# Patient Record
Sex: MALE | Race: WHITE | NOT HISPANIC OR LATINO | URBAN - METROPOLITAN AREA
[De-identification: names, ages, dates, MRNs, and addresses within clinical notes are randomized per-mention and may not be internally consistent; named-entity substitution may affect disease eponyms.]

---

## 2023-12-28 ENCOUNTER — APPOINTMENT (OUTPATIENT)
Dept: URGENT CARE | Facility: CLINIC | Age: 28
End: 2023-12-28

## 2024-01-29 ENCOUNTER — HOSPITAL ENCOUNTER (INPATIENT)
Facility: HOSPITAL | Age: 29
LOS: 4 days | Discharge: HOME | DRG: 420 | End: 2024-02-02
Attending: EMERGENCY MEDICINE | Admitting: INTERNAL MEDICINE
Payer: MEDICARE

## 2024-01-29 DIAGNOSIS — E10.10 DKA, TYPE 1 (HCC): Primary | ICD-10-CM

## 2024-01-29 PROBLEM — R11.2 N&V (NAUSEA AND VOMITING): Status: ACTIVE | Noted: 2024-01-29

## 2024-01-29 PROBLEM — N17.9 AKI (ACUTE KIDNEY INJURY) (HCC): Status: ACTIVE | Noted: 2024-01-29

## 2024-01-29 PROBLEM — E87.20 LACTIC ACIDOSIS: Status: ACTIVE | Noted: 2024-01-29

## 2024-01-29 PROBLEM — I51.9 CARDIAC COMPLICATION: Status: ACTIVE | Noted: 2023-03-31

## 2024-01-29 PROBLEM — E83.39 HYPERPHOSPHATEMIA: Status: ACTIVE | Noted: 2024-01-29

## 2024-01-29 PROBLEM — E87.1 HYPONATREMIA: Status: ACTIVE | Noted: 2024-01-29

## 2024-01-29 PROBLEM — R65.10 SIRS (SYSTEMIC INFLAMMATORY RESPONSE SYNDROME) (HCC): Status: ACTIVE | Noted: 2024-01-29

## 2024-01-29 PROBLEM — G43.009 MIGRAINE WITHOUT AURA: Status: ACTIVE | Noted: 2023-03-31

## 2024-01-29 PROBLEM — Z86.69 HX OF SEIZURE DISORDER: Status: ACTIVE | Noted: 2024-01-29

## 2024-01-29 PROBLEM — F32.A DEPRESSION: Status: ACTIVE | Noted: 2022-02-03

## 2024-01-29 LAB
ALBUMIN SERPL BCP-MCNC: 4.8 G/DL (ref 3.5–5)
ALP SERPL-CCNC: 90 U/L (ref 34–104)
ALT SERPL W P-5'-P-CCNC: 19 U/L (ref 7–52)
ANION GAP SERPL CALCULATED.3IONS-SCNC: 28 MMOL/L
ANION GAP SERPL CALCULATED.3IONS-SCNC: 38 MMOL/L
APAP SERPL-MCNC: <2 UG/ML (ref 10–20)
AST SERPL W P-5'-P-CCNC: 19 U/L (ref 13–39)
ATRIAL RATE: 121 BPM
ATRIAL RATE: 122 BPM
ATRIAL RATE: 123 BPM
BACTERIA UR QL AUTO: ABNORMAL /HPF
BASE EX.OXY STD BLDV CALC-SCNC: 74.8 % (ref 60–80)
BASE EXCESS BLDA CALC-SCNC: -22 MMOL/L (ref -2–3)
BASE EXCESS BLDV CALC-SCNC: -23.8 MMOL/L
BETA-HYDROXYBUTYRATE: 5.2 MMOL/L
BILIRUB SERPL-MCNC: 1.47 MG/DL (ref 0.2–1)
BILIRUB UR QL STRIP: NEGATIVE
BUN SERPL-MCNC: 33 MG/DL (ref 5–25)
BUN SERPL-MCNC: 35 MG/DL (ref 5–25)
BUN SERPL-MCNC: 36 MG/DL (ref 5–25)
BUN SERPL-MCNC: 36 MG/DL (ref 5–25)
CA-I BLD-SCNC: 1.12 MMOL/L (ref 1.12–1.32)
CALCIUM SERPL-MCNC: 10.1 MG/DL (ref 8.4–10.2)
CALCIUM SERPL-MCNC: 9.4 MG/DL (ref 8.4–10.2)
CHLORIDE SERPL-SCNC: 100 MMOL/L (ref 96–108)
CHLORIDE SERPL-SCNC: 88 MMOL/L (ref 96–108)
CLARITY UR: CLEAR
CO2 SERPL-SCNC: 10 MMOL/L (ref 21–32)
CO2 SERPL-SCNC: 7 MMOL/L (ref 21–32)
CO2 SERPL-SCNC: 7 MMOL/L (ref 21–32)
CO2 SERPL-SCNC: 8 MMOL/L (ref 21–32)
COLOR UR: ABNORMAL
CREAT SERPL-MCNC: 1.53 MG/DL (ref 0.6–1.3)
CREAT SERPL-MCNC: 1.94 MG/DL (ref 0.6–1.3)
ERYTHROCYTE [DISTWIDTH] IN BLOOD BY AUTOMATED COUNT: 12.2 % (ref 11.6–15.1)
EST. AVERAGE GLUCOSE BLD GHB EST-MCNC: 212 MG/DL
ETHANOL SERPL-MCNC: <10 MG/DL
GFR SERPL CREATININE-BSD FRML MDRD: 45 ML/MIN/1.73SQ M
GFR SERPL CREATININE-BSD FRML MDRD: 60 ML/MIN/1.73SQ M
GLUCOSE SERPL-MCNC: 368 MG/DL (ref 65–140)
GLUCOSE SERPL-MCNC: 436 MG/DL (ref 65–140)
GLUCOSE SERPL-MCNC: 525 MG/DL (ref 65–140)
GLUCOSE SERPL-MCNC: 541 MG/DL (ref 65–140)
GLUCOSE SERPL-MCNC: 541 MG/DL (ref 65–140)
GLUCOSE SERPL-MCNC: 656 MG/DL (ref 65–140)
GLUCOSE SERPL-MCNC: 802 MG/DL (ref 65–140)
GLUCOSE SERPL-MCNC: 803 MG/DL (ref 65–140)
GLUCOSE SERPL-MCNC: 803 MG/DL (ref 65–140)
GLUCOSE SERPL-MCNC: >500 MG/DL (ref 65–140)
GLUCOSE SERPL-MCNC: >600 MG/DL (ref 65–140)
GLUCOSE UR STRIP-MCNC: ABNORMAL MG/DL
HBA1C MFR BLD: 9 %
HCO3 BLDA-SCNC: 7.2 MMOL/L (ref 24–30)
HCO3 BLDV-SCNC: 7 MMOL/L (ref 24–30)
HCT VFR BLD AUTO: 50.5 % (ref 36.5–49.3)
HCT VFR BLD CALC: 52 % (ref 36.5–49.3)
HGB BLD-MCNC: 16 G/DL (ref 12–17)
HGB BLDA-MCNC: 17.7 G/DL (ref 12–17)
HGB UR QL STRIP.AUTO: ABNORMAL
HYALINE CASTS #/AREA URNS LPF: ABNORMAL /LPF
KETONES UR STRIP-MCNC: ABNORMAL MG/DL
LACTATE SERPL-SCNC: 3.5 MMOL/L (ref 0.5–2)
LACTATE SERPL-SCNC: 7.8 MMOL/L (ref 0.5–2)
LEUKOCYTE ESTERASE UR QL STRIP: NEGATIVE
MAGNESIUM SERPL-MCNC: 2.1 MG/DL (ref 1.9–2.7)
MAGNESIUM SERPL-MCNC: 2.3 MG/DL (ref 1.9–2.7)
MAGNESIUM SERPL-MCNC: 2.4 MG/DL (ref 1.9–2.7)
MCH RBC QN AUTO: 27.9 PG (ref 26.8–34.3)
MCHC RBC AUTO-ENTMCNC: 31.7 G/DL (ref 31.4–37.4)
MCV RBC AUTO: 88 FL (ref 82–98)
NITRITE UR QL STRIP: NEGATIVE
NON-SQ EPI CELLS URNS QL MICRO: ABNORMAL /HPF
O2 CT BLDV-SCNC: 17.8 ML/DL
P AXIS: 73 DEGREES
P AXIS: 76 DEGREES
P AXIS: 77 DEGREES
PCO2 BLD: 24.9 MM HG (ref 42–50)
PCO2 BLD: 8 MMOL/L (ref 21–32)
PCO2 BLDV: 30.1 MM HG (ref 42–50)
PH BLD: 7.07 [PH] (ref 7.3–7.4)
PH BLDV: 6.98 [PH] (ref 7.3–7.4)
PH UR STRIP.AUTO: 5.5 [PH]
PHOSPHATE SERPL-MCNC: 10.4 MG/DL (ref 2.7–4.5)
PHOSPHATE SERPL-MCNC: 10.5 MG/DL (ref 2.7–4.5)
PHOSPHATE SERPL-MCNC: 4.7 MG/DL (ref 2.7–4.5)
PLATELET # BLD AUTO: 324 THOUSANDS/UL (ref 149–390)
PMV BLD AUTO: 12.6 FL (ref 8.9–12.7)
PO2 BLD: 29 MM HG (ref 35–45)
PO2 BLDV: 49 MM HG (ref 35–45)
POTASSIUM BLD-SCNC: 5.1 MMOL/L (ref 3.5–5.3)
POTASSIUM SERPL-SCNC: 4.8 MMOL/L (ref 3.5–5.3)
POTASSIUM SERPL-SCNC: 5 MMOL/L (ref 3.5–5.3)
PR INTERVAL: 132 MS
PR INTERVAL: 138 MS
PR INTERVAL: 142 MS
PROT SERPL-MCNC: 8.9 G/DL (ref 6.4–8.4)
PROT UR STRIP-MCNC: ABNORMAL MG/DL
QRS AXIS: 135 DEGREES
QRS AXIS: 156 DEGREES
QRS AXIS: 175 DEGREES
QRSD INTERVAL: 90 MS
QRSD INTERVAL: 92 MS
QRSD INTERVAL: 94 MS
QT INTERVAL: 328 MS
QT INTERVAL: 330 MS
QT INTERVAL: 340 MS
QTC INTERVAL: 467 MS
QTC INTERVAL: 472 MS
QTC INTERVAL: 482 MS
RBC # BLD AUTO: 5.73 MILLION/UL (ref 3.88–5.62)
RBC #/AREA URNS AUTO: ABNORMAL /HPF
SALICYLATES SERPL-MCNC: <5 MG/DL (ref 3–20)
SAO2 % BLD FROM PO2: 35 % (ref 60–85)
SODIUM BLD-SCNC: 132 MMOL/L (ref 136–145)
SODIUM SERPL-SCNC: 133 MMOL/L (ref 135–147)
SODIUM SERPL-SCNC: 133 MMOL/L (ref 135–147)
SODIUM SERPL-SCNC: 134 MMOL/L (ref 135–147)
SODIUM SERPL-SCNC: 138 MMOL/L (ref 135–147)
SP GR UR STRIP.AUTO: 1.02 (ref 1–1.03)
SPECIMEN SOURCE: ABNORMAL
T WAVE AXIS: 48 DEGREES
T WAVE AXIS: 54 DEGREES
T WAVE AXIS: 58 DEGREES
UROBILINOGEN UR STRIP-ACNC: <2 MG/DL
VENTRICULAR RATE: 121 BPM
VENTRICULAR RATE: 122 BPM
VENTRICULAR RATE: 123 BPM
WBC # BLD AUTO: 36.38 THOUSAND/UL (ref 4.31–10.16)
WBC #/AREA URNS AUTO: ABNORMAL /HPF

## 2024-01-29 PROCEDURE — 80048 BASIC METABOLIC PNL TOTAL CA: CPT | Performed by: NURSE PRACTITIONER

## 2024-01-29 PROCEDURE — 96374 THER/PROPH/DIAG INJ IV PUSH: CPT

## 2024-01-29 PROCEDURE — 83735 ASSAY OF MAGNESIUM: CPT | Performed by: EMERGENCY MEDICINE

## 2024-01-29 PROCEDURE — 84132 ASSAY OF SERUM POTASSIUM: CPT

## 2024-01-29 PROCEDURE — 80048 BASIC METABOLIC PNL TOTAL CA: CPT | Performed by: EMERGENCY MEDICINE

## 2024-01-29 PROCEDURE — 80053 COMPREHEN METABOLIC PANEL: CPT | Performed by: EMERGENCY MEDICINE

## 2024-01-29 PROCEDURE — 99284 EMERGENCY DEPT VISIT MOD MDM: CPT

## 2024-01-29 PROCEDURE — 82805 BLOOD GASES W/O2 SATURATION: CPT | Performed by: EMERGENCY MEDICINE

## 2024-01-29 PROCEDURE — 83036 HEMOGLOBIN GLYCOSYLATED A1C: CPT | Performed by: EMERGENCY MEDICINE

## 2024-01-29 PROCEDURE — 82010 KETONE BODYS QUAN: CPT | Performed by: EMERGENCY MEDICINE

## 2024-01-29 PROCEDURE — 36415 COLL VENOUS BLD VENIPUNCTURE: CPT | Performed by: EMERGENCY MEDICINE

## 2024-01-29 PROCEDURE — 99223 1ST HOSP IP/OBS HIGH 75: CPT | Performed by: PHYSICIAN ASSISTANT

## 2024-01-29 PROCEDURE — 82947 ASSAY GLUCOSE BLOOD QUANT: CPT

## 2024-01-29 PROCEDURE — 85014 HEMATOCRIT: CPT

## 2024-01-29 PROCEDURE — 93005 ELECTROCARDIOGRAM TRACING: CPT

## 2024-01-29 PROCEDURE — 99291 CRITICAL CARE FIRST HOUR: CPT | Performed by: EMERGENCY MEDICINE

## 2024-01-29 PROCEDURE — 84295 ASSAY OF SERUM SODIUM: CPT

## 2024-01-29 PROCEDURE — 82077 ASSAY SPEC XCP UR&BREATH IA: CPT | Performed by: PHYSICIAN ASSISTANT

## 2024-01-29 PROCEDURE — 85027 COMPLETE CBC AUTOMATED: CPT | Performed by: EMERGENCY MEDICINE

## 2024-01-29 PROCEDURE — 82948 REAGENT STRIP/BLOOD GLUCOSE: CPT

## 2024-01-29 PROCEDURE — 81001 URINALYSIS AUTO W/SCOPE: CPT | Performed by: PHYSICIAN ASSISTANT

## 2024-01-29 PROCEDURE — 82803 BLOOD GASES ANY COMBINATION: CPT

## 2024-01-29 PROCEDURE — 82947 ASSAY GLUCOSE BLOOD QUANT: CPT | Performed by: NURSE PRACTITIONER

## 2024-01-29 PROCEDURE — 82330 ASSAY OF CALCIUM: CPT

## 2024-01-29 PROCEDURE — 83605 ASSAY OF LACTIC ACID: CPT | Performed by: EMERGENCY MEDICINE

## 2024-01-29 PROCEDURE — 84100 ASSAY OF PHOSPHORUS: CPT | Performed by: EMERGENCY MEDICINE

## 2024-01-29 PROCEDURE — 80179 DRUG ASSAY SALICYLATE: CPT | Performed by: PHYSICIAN ASSISTANT

## 2024-01-29 PROCEDURE — 84100 ASSAY OF PHOSPHORUS: CPT | Performed by: PHYSICIAN ASSISTANT

## 2024-01-29 PROCEDURE — 82947 ASSAY GLUCOSE BLOOD QUANT: CPT | Performed by: PHYSICIAN ASSISTANT

## 2024-01-29 PROCEDURE — 83735 ASSAY OF MAGNESIUM: CPT | Performed by: PHYSICIAN ASSISTANT

## 2024-01-29 PROCEDURE — 80143 DRUG ASSAY ACETAMINOPHEN: CPT | Performed by: PHYSICIAN ASSISTANT

## 2024-01-29 RX ORDER — SODIUM CHLORIDE 9 MG/ML
500 INJECTION, SOLUTION INTRAVENOUS CONTINUOUS
Status: DISCONTINUED | OUTPATIENT
Start: 2024-01-29 | End: 2024-01-29

## 2024-01-29 RX ORDER — SODIUM CHLORIDE, SODIUM LACTATE, POTASSIUM CHLORIDE, CALCIUM CHLORIDE 600; 310; 30; 20 MG/100ML; MG/100ML; MG/100ML; MG/100ML
250 INJECTION, SOLUTION INTRAVENOUS CONTINUOUS
Status: DISCONTINUED | OUTPATIENT
Start: 2024-01-29 | End: 2024-01-30

## 2024-01-29 RX ORDER — SODIUM CHLORIDE 9 MG/ML
3 INJECTION INTRAVENOUS
Status: DISCONTINUED | OUTPATIENT
Start: 2024-01-29 | End: 2024-02-02 | Stop reason: HOSPADM

## 2024-01-29 RX ORDER — SODIUM CHLORIDE 9 MG/ML
2000 INJECTION, SOLUTION INTRAVENOUS CONTINUOUS
Status: DISPENSED | OUTPATIENT
Start: 2024-01-29 | End: 2024-01-29

## 2024-01-29 RX ORDER — ONDANSETRON 2 MG/ML
4 INJECTION INTRAMUSCULAR; INTRAVENOUS ONCE
Status: DISCONTINUED | OUTPATIENT
Start: 2024-01-29 | End: 2024-01-30

## 2024-01-29 RX ORDER — DEXTROSE AND SODIUM CHLORIDE 5; .45 G/100ML; G/100ML
250 INJECTION, SOLUTION INTRAVENOUS CONTINUOUS
Status: DISCONTINUED | OUTPATIENT
Start: 2024-01-29 | End: 2024-01-30

## 2024-01-29 RX ORDER — ONDANSETRON 2 MG/ML
4 INJECTION INTRAMUSCULAR; INTRAVENOUS EVERY 4 HOURS PRN
Status: DISCONTINUED | OUTPATIENT
Start: 2024-01-29 | End: 2024-01-30

## 2024-01-29 RX ORDER — HEPARIN SODIUM 5000 [USP'U]/ML
5000 INJECTION, SOLUTION INTRAVENOUS; SUBCUTANEOUS EVERY 8 HOURS SCHEDULED
Status: DISCONTINUED | OUTPATIENT
Start: 2024-01-29 | End: 2024-02-02 | Stop reason: HOSPADM

## 2024-01-29 RX ORDER — SODIUM CHLORIDE 9 MG/ML
250 INJECTION, SOLUTION INTRAVENOUS CONTINUOUS
Status: DISCONTINUED | OUTPATIENT
Start: 2024-01-29 | End: 2024-01-29

## 2024-01-29 RX ORDER — SODIUM CHLORIDE, SODIUM LACTATE, POTASSIUM CHLORIDE, CALCIUM CHLORIDE 600; 310; 30; 20 MG/100ML; MG/100ML; MG/100ML; MG/100ML
500 INJECTION, SOLUTION INTRAVENOUS CONTINUOUS
Status: DISPENSED | OUTPATIENT
Start: 2024-01-29 | End: 2024-01-29

## 2024-01-29 RX ORDER — CHLORHEXIDINE GLUCONATE ORAL RINSE 1.2 MG/ML
15 SOLUTION DENTAL EVERY 12 HOURS SCHEDULED
Status: DISCONTINUED | OUTPATIENT
Start: 2024-01-29 | End: 2024-01-31

## 2024-01-29 RX ORDER — ONDANSETRON 2 MG/ML
4 INJECTION INTRAMUSCULAR; INTRAVENOUS ONCE
Status: COMPLETED | OUTPATIENT
Start: 2024-01-29 | End: 2024-01-29

## 2024-01-29 RX ADMIN — SODIUM CHLORIDE, SODIUM LACTATE, POTASSIUM CHLORIDE, AND CALCIUM CHLORIDE 500 ML/HR: .6; .31; .03; .02 INJECTION, SOLUTION INTRAVENOUS at 20:29

## 2024-01-29 RX ADMIN — SODIUM CHLORIDE, SODIUM LACTATE, POTASSIUM CHLORIDE, AND CALCIUM CHLORIDE 500 ML/HR: .6; .31; .03; .02 INJECTION, SOLUTION INTRAVENOUS at 18:18

## 2024-01-29 RX ADMIN — ONDANSETRON 4 MG: 2 INJECTION INTRAMUSCULAR; INTRAVENOUS at 18:27

## 2024-01-29 RX ADMIN — SODIUM CHLORIDE 5.9 UNITS/HR: 9 INJECTION, SOLUTION INTRAVENOUS at 17:16

## 2024-01-29 RX ADMIN — SODIUM CHLORIDE 2000 ML/HR: 0.9 INJECTION, SOLUTION INTRAVENOUS at 16:42

## 2024-01-29 RX ADMIN — HEPARIN SODIUM 5000 UNITS: 5000 INJECTION INTRAVENOUS; SUBCUTANEOUS at 21:46

## 2024-01-29 RX ADMIN — CHLORHEXIDINE GLUCONATE 15 ML: 1.2 SOLUTION ORAL at 21:46

## 2024-01-29 RX ADMIN — SODIUM CHLORIDE, SODIUM LACTATE, POTASSIUM CHLORIDE, AND CALCIUM CHLORIDE 250 ML/HR: .6; .31; .03; .02 INJECTION, SOLUTION INTRAVENOUS at 22:38

## 2024-01-29 RX ADMIN — ONDANSETRON 4 MG: 2 INJECTION INTRAMUSCULAR; INTRAVENOUS at 16:53

## 2024-01-29 NOTE — ASSESSMENT & PLAN NOTE
Baseline Cr 0.9 in care everywhere  Cr on admission 1.94  UA bland  Likely prerenal 2/2 severe DKA, intravascular dehydration   Aggressive IVF resuscitation   Trend renal indices  Strict I/O

## 2024-01-29 NOTE — ASSESSMENT & PLAN NOTE
Lab Results   Component Value Date    HGBA1C 10.9 (H) 05/20/2022       Recent Labs     01/29/24  1643   POCGLU >500*         Blood Sugar Average: Last 72 hrs:    Hx of T1DM on SQ insulin regimen  Pt reports running out of insulin needles 2 days ago. He has since had progressive N/V/D and polydipsia  DKA POA AEB: pH 6.98, CO2 7, AG 38, , BHB 5.2  Initiate DKA protocol  IVF per protocol; transition to dextrose-containing IVF when BG < 250  DKA insulin gtt; transition to non-DKA insulin gtt when AG closed x 2, CO2>20 x2  Q1h BG  Q4h bmp, mag, and phos  NPO for now   Check Hgb A1C  Replete electrolytes PRN with goal K>4 and Mag>2  Will need to ensure refill of insulin needles prior to d/c

## 2024-01-29 NOTE — ASSESSMENT & PLAN NOTE
Lactic 7.8 on admission -- trend   In the setting of severe DKA -- do not suspect infectious etiology at this time  Continue IVF resuscitation per DKA protocol

## 2024-01-29 NOTE — ED PROVIDER NOTES
History  Chief Complaint   Patient presents with    Vomiting     Pt is known diabetic. Unable to buy insulin needles for 2 days. Started with vomiting, rapid breathing, and feeling dry mouth and weakness couldn't get up today had to call [phone]     28-year-old type I diabetic male presents for the evaluation of vomiting, weakness and polyuria polydipsia.  The patient ran out of his insulin supplies 2 days ago.      Vomiting      Prior to Admission Medications   Prescriptions Last Dose Informant Patient Reported? Taking?   insulin NPH-insulin regular (NovoLIN 70/30) 100 units/mL subcutaneous injection Past Week  Yes Yes   Sig: Inject under the skin 3 (three) times a day      Facility-Administered Medications: None       Past Medical History:   Diagnosis Date    Type 1 diabetes mellitus (HCC)        History reviewed. No pertinent surgical history.    History reviewed. No pertinent family history.  I have reviewed and agree with the history as documented.    E-Cigarette/Vaping     E-Cigarette/Vaping Substances          Review of Systems   Gastrointestinal:  Positive for vomiting.       Physical Exam  Physical Exam  Constitutional:       General: He is in acute distress.      Appearance: He is underweight. He is ill-appearing.   HENT:      Mouth/Throat:      Mouth: Mucous membranes are dry.      Comments: Ketones on breath  Cardiovascular:      Rate and Rhythm: Regular rhythm. Tachycardia present.   Pulmonary:      Effort: Tachypnea and respiratory distress present.      Breath sounds: Normal breath sounds.   Abdominal:      General: Abdomen is flat.   Skin:     General: Skin is dry.   Neurological:      General: No focal deficit present.      Mental Status: He is alert.         Vital Signs  ED Triage Vitals   Temperature Pulse Respirations Blood Pressure SpO2   01/29/24 1635 01/29/24 1635 01/29/24 1635 01/29/24 1635 01/29/24 1635   (!) 97.1 °F (36.2 °C) (!) 120 (!) 60 137/97 95 %      Temp Source Heart Rate Source  Patient Position - Orthostatic VS BP Location FiO2 (%)   01/29/24 1635 01/29/24 1635 01/29/24 1635 01/29/24 1635 --   Axillary Monitor Lying Right arm       Pain Score       01/29/24 1742       No Pain           Vitals:    01/31/24 2000 01/31/24 2349 02/01/24 0437 02/01/24 0800   BP: 143/90 144/87 (!) 160/110 152/96   Pulse: (!) 106 (!) 120 96 97   Patient Position - Orthostatic VS: Lying Lying Lying Lying         Visual Acuity      ED Medications  Medications   sodium chloride (PF) 0.9 % injection 3 mL (has no administration in time range)   sodium chloride 0.9 % infusion (0 mL/hr Intravenous Stopped 1/29/24 1900)   lactated ringers infusion (0 mL/hr Intravenous Stopped 1/29/24 2234)   heparin (porcine) subcutaneous injection 5,000 Units (5,000 Units Subcutaneous Given 2/1/24 0545)   sodium phosphate 12 mmol in sodium chloride 0.9 % 100 mL Infusion (0 mmol Intravenous Stopped 1/30/24 0400)     Followed by   potassium-sodium phosphateS (K-PHOS,PHOSPHA 250) -250 mg tablet 2 tablet (2 tablets Oral Not Given 1/30/24 0135)   acetaminophen (TYLENOL) tablet 650 mg (650 mg Oral Given 1/30/24 0747)   insulin regular (HumuLIN R,NovoLIN R) 1 Units/mL in sodium chloride 0.9 % 100 mL infusion (0 Units/hr Intravenous Stopped 1/31/24 1423)   trimethobenzamide (TIGAN) IM injection 200 mg (200 mg Intramuscular Given 1/31/24 1232)   metoclopramide (REGLAN) injection 10 mg (10 mg Intravenous Given 1/31/24 2348)   insulin glargine (LANTUS) subcutaneous injection 15 Units 0.15 mL (15 Units Subcutaneous Given 2/1/24 0804)   insulin lispro (HumaLOG) 100 units/mL subcutaneous injection 2 Units (2 Units Subcutaneous Given 2/1/24 0804)   multi-electrolyte (PLASMALYTE-A/ISOLYTE-S PH 7.4) IV solution (125 mL/hr Intravenous New Bag 2/1/24 0802)   insulin lispro (HumaLOG) 100 units/mL subcutaneous injection 2-12 Units (6 Units Subcutaneous Given 2/1/24 0804)   potassium phosphates 12 mmol in sodium chloride 0.9 % 250 mL infusion (12 mmol  Intravenous New Bag 2/1/24 0934)   potassium-sodium phosphateS (K-PHOS,PHOSPHA 250) -250 mg tablet 1 tablet (1 tablet Oral Given 2/1/24 0802)   multi-electrolyte (PLASMALYTE-A/ISOLYTE-S PH 7.4) IV solution 1,000 mL (has no administration in time range)   ondansetron (ZOFRAN) injection 4 mg (4 mg Intravenous Given 1/29/24 1653)   metoclopramide (REGLAN) injection 10 mg (10 mg Intravenous Given 1/30/24 0218)   potassium phosphates 30 mmol in sodium chloride 0.9 % 250 mL infusion (0 mmol Intravenous Stopped 1/30/24 1601)   sodium phosphate 30 mmol in dextrose 5 % 250 mL Infusion (0 mmol Intravenous Stopped 1/30/24 1601)   multi-electrolyte (PLASMALYTE-A/ISOLYTE-S PH 7.4) IV solution 500 mL (0 mL Intravenous Stopped 1/30/24 1601)   magnesium sulfate 2 g/50 mL IVPB (premix) 2 g (0 g Intravenous Stopped 1/30/24 2000)   potassium chloride (Klor-Con M20) CR tablet 40 mEq (40 mEq Oral Given 1/30/24 1955)   potassium chloride 20 mEq IVPB (premix) (0 mEq Intravenous Stopped 1/31/24 1725)   potassium phosphates 12 mmol in sodium chloride 0.9 % 250 mL infusion (0 mmol Intravenous Stopped 1/31/24 1858)   multi-electrolyte (PLASMALYTE-A/ISOLYTE-S PH 7.4) IV solution 1,000 mL (0 mL Intravenous Stopped 2/1/24 0934)       Diagnostic Studies  Results Reviewed       Procedure Component Value Units Date/Time    Basic metabolic panel [[id number]]  (Abnormal) Collected: 01/30/24 1705    Lab Status: Final result Specimen: Blood from Arm, Right Updated: 01/30/24 1726     Sodium 141 mmol/L      Potassium 3.5 mmol/L      Chloride 108 mmol/L      CO2 24 mmol/L      ANION GAP 9 mmol/L      BUN 16 mg/dL      Creatinine 0.93 mg/dL      Glucose 172 mg/dL      Calcium 8.3 mg/dL      eGFR 109 ml/min/1.73sq m     Narrative:      National Kid[employer] Disease Foundation guidelines for Chronic Kidney Disease (CKD):     Stage 1 with normal or high GFR (GFR > 90 mL/min/1.73 square meters)    Stage 2 Mild CKD (GFR = 60-89 mL/min/1.73 square meters)     Stage 3A Moderate CKD (GFR = 45-59 mL/min/1.73 square meters)    Stage 3B Moderate CKD (GFR = 30-44 mL/min/1.73 square meters)    Stage 4 Severe CKD (GFR = 15-29 mL/min/1.73 square meters)    Stage 5 End Stage CKD (GFR <15 mL/min/1.73 square meters)  Note: GFR calculation is accurate only with a steady state creatinine    Magnesium [[id number]]  (Normal) Collected: 01/30/24 1705    Lab Status: Final result Specimen: Blood from Arm, Right Updated: 01/30/24 1726     Magnesium 2.1 mg/dL     Phosphorus [[id number]]  (Normal) Collected: 01/30/24 1705    Lab Status: Final result Specimen: Blood from Arm, Right Updated: 01/30/24 1726     Phosphorus 4.4 mg/dL     Basic metabolic panel [[id number]]  (Abnormal) Collected: 01/30/24 1227    Lab Status: Final result Specimen: Blood from Arm, Right Updated: 01/30/24 1323     Sodium 140 mmol/L      Potassium 4.3 mmol/L      Chloride 109 mmol/L      CO2 20 mmol/L      ANION GAP 11 mmol/L      BUN 21 mg/dL      Creatinine 1.11 mg/dL      Glucose 213 mg/dL      Calcium 8.5 mg/dL      eGFR 88 ml/min/1.73sq m     Narrative:      National Kidney Disease Foundation guidelines for Chronic Kidney Disease (CKD):     Stage 1 with normal or high GFR (GFR > 90 mL/min/1.73 square meters)    Stage 2 Mild CKD (GFR = 60-89 mL/min/1.73 square meters)    Stage 3A Moderate CKD (GFR = 45-59 mL/min/1.73 square meters)    Stage 3B Moderate CKD (GFR = 30-44 mL/min/1.73 square meters)    Stage 4 Severe CKD (GFR = 15-29 mL/min/1.73 square meters)    Stage 5 End Stage CKD (GFR <15 mL/min/1.73 square meters)  Note: GFR calculation is accurate only with a steady state creatinine    Magnesium [[id number]]  (Normal) Collected: 01/30/24 1227    Lab Status: Final result Specimen: Blood from Arm, Right Updated: 01/30/24 1323     Magnesium 1.9 mg/dL     Phosphorus [[id number]]  (Abnormal) Collected: 01/30/24 1227    Lab Status: Final result Specimen: Blood from Arm, Right Updated: 01/30/24 1323     Phosphorus 4.7 mg/dL      CBC and differential [[id number]]  (Abnormal) Collected: 01/30/24 0747    Lab Status: Final result Specimen: Blood from Arm, Left Updated: 01/30/24 0920     WBC 28.81 Thousand/uL      RBC 5.32 Million/uL      Hemoglobin 14.7 g/dL      Hematocrit 42.5 %      MCV 80 fL      MCH 27.6 pg      MCHC 34.6 g/dL      RDW 12.0 %      MPV 11.1 fL      Platelets 274 Thousands/uL      nRBC 0 /100 WBCs      Neutrophils Relative 84 %      Immat GRANS % 1 %      Lymphocytes Relative 8 %      Monocytes Relative 7 %      Eosinophils Relative 0 %      Basophils Relative 0 %      Neutrophils Absolute 24.08 Thousands/µL      Immature Grans Absolute 0.41 Thousand/uL      Lymphocytes Absolute 2.28 Thousands/µL      Monocytes Absolute 1.98 Thousand/µL      Eosinophils Absolute 0.00 Thousand/µL      Basophils Absolute 0.06 Thousands/µL     Phosphorus [[id number]]  (Abnormal) Collected: 01/30/24 0747    Lab Status: Final result Specimen: Blood from Arm, Left Updated: 01/30/24 0817     Phosphorus <1.0 mg/dL     Basic metabolic panel [[id number]]  (Abnormal) Collected: 01/30/24 0747    Lab Status: Final result Specimen: Blood from Arm, Left Updated: 01/30/24 0813     Sodium 143 mmol/L      Potassium 3.5 mmol/L      Chloride 108 mmol/L      CO2 23 mmol/L      ANION GAP 12 mmol/L      BUN 22 mg/dL      Creatinine 1.34 mg/dL      Glucose 86 mg/dL      Calcium 10.1 mg/dL      eGFR 70 ml/min/1.73sq m     Narrative:      National Kidney Disease Foundation guidelines for Chronic Kidney Disease (CKD):     Stage 1 with normal or high GFR (GFR > 90 mL/min/1.73 square meters)    Stage 2 Mild CKD (GFR = 60-89 mL/min/1.73 square meters)    Stage 3A Moderate CKD (GFR = 45-59 mL/min/1.73 square meters)    Stage 3B Moderate CKD (GFR = 30-44 mL/min/1.73 square meters)    Stage 4 Severe CKD (GFR = 15-29 mL/min/1.73 square meters)    Stage 5 End Stage CKD (GFR <15 mL/min/1.73 square meters)  Note: GFR calculation is accurate only with a steady state creatinine     Magnesium [[id number]]  (Normal) Collected: 01/30/24 0747    Lab Status: Final result Specimen: Blood from Arm, Left Updated: 01/30/24 0813     Magnesium 2.0 mg/dL     Basic metabolic panel [[id number]]  (Abnormal) Collected: 01/30/24 0026    Lab Status: Final result Specimen: Blood from Arm, Left Updated: 01/30/24 0053     Sodium 138 mmol/L      Potassium 5.3 mmol/L      Chloride 106 mmol/L      CO2 15 mmol/L      ANION GAP 17 mmol/L      BUN 27 mg/dL      Creatinine 1.46 mg/dL      Glucose 289 mg/dL      Calcium 9.4 mg/dL      eGFR 63 ml/min/1.73sq m     Narrative:      National Kid[employer] Disease Foundation guidelines for Chronic Kidney Disease (CKD):     Stage 1 with normal or high GFR (GFR > 90 mL/min/1.73 square meters)    Stage 2 Mild CKD (GFR = 60-89 mL/min/1.73 square meters)    Stage 3A Moderate CKD (GFR = 45-59 mL/min/1.73 square meters)    Stage 3B Moderate CKD (GFR = 30-44 mL/min/1.73 square meters)    Stage 4 Severe CKD (GFR = 15-29 mL/min/1.73 square meters)    Stage 5 End Stage CKD (GFR <15 mL/min/1.73 square meters)  Note: GFR calculation is accurate only with a steady state creatinine    Magnesium [[id number]]  (Normal) Collected: 01/30/24 0026    Lab Status: Final result Specimen: Blood from Arm, Left Updated: 01/30/24 0053     Magnesium 2.3 mg/dL     Phosphorus [[id number]]  (Abnormal) Collected: 01/30/24 0026    Lab Status: Final result Specimen: Blood from Arm, Left Updated: 01/30/24 0053     Phosphorus 2.0 mg/dL     Salicylate level [[id number]]  (Normal) Collected: 01/29/24 1942    Lab Status: Final result Specimen: Blood from Arm, Left Updated: 01/29/24 2016     Salicylate Lvl <5 mg/dL     Narrative:      verified by repeat analysis.    Acetaminophen level-If concentration is detectable, please discuss with medical  on call. [[id number]]  (Abnormal) Collected: 01/29/24 1942    Lab Status: Final result Specimen: Blood from Arm, Left Updated: 01/29/24 2016     Acetaminophen Level <2 ug/mL      Narrative:      verified by repeat analysis.    Lactic acid 2 Hours [[id number]]  (Abnormal) Collected: 01/29/24 1942    Lab Status: Final result Specimen: Blood from Arm, Left Updated: 01/29/24 2006     LACTIC ACID 3.5 mmol/L     Narrative:      Result may be elevated if tourniquet was used during collection.    Magnesium [[id number]]  (Normal) Collected: 01/29/24 1942    Lab Status: Final result Specimen: Blood from Arm, Left Updated: 01/29/24 2005     Magnesium 2.1 mg/dL     Phosphorus [[id number]]  (Abnormal) Collected: 01/29/24 1942    Lab Status: Final result Specimen: Blood from Arm, Left Updated: 01/29/24 2005     Phosphorus 4.7 mg/dL     Ethanol [[id number]]  (Normal) Collected: 01/29/24 1942    Lab Status: Final result Specimen: Blood from Arm, Left Updated: 01/29/24 2004     Ethanol Lvl <10 mg/dL     Hemoglobin A1c w/EAG Estimation [[id number]]  (Abnormal) Collected: 01/29/24 1650    Lab Status: Final result Specimen: Blood from Arm, Right Updated: 01/29/24 1953     Hemoglobin A1C 9.0 %       mg/dl     Urine Microscopic [[id number]]  (Abnormal) Collected: 01/29/24 1733    Lab Status: Final result Specimen: Urine, Other Updated: 01/29/24 1812     RBC, UA None Seen /hpf      WBC, UA 0-1 /hpf      Epithelial Cells None Seen /hpf      Bacteria, UA Occasional /hpf      Hyaline Casts, UA 0-1 /lpf     Narrative:      Microscopic performed by Carmen Hunter.     UA w Reflex to Microscopic w Reflex to Culture [[id number]]  (Abnormal) Collected: 01/29/24 1733    Lab Status: Final result Specimen: Urine, Other Updated: 01/29/24 1753     Color, UA Light Yellow     Clarity, UA Clear     Specific Gravity, UA 1.025     pH, UA 5.5     Leukocytes, UA Negative     Nitrite, UA Negative     Protein, UA 30 (1+) mg/dl      Glucose,  (3/20%) mg/dl      Ketones, UA 80 (3+) mg/dl      Urobilinogen, UA <2.0 mg/dl      Bilirubin, UA Negative     Occult Blood, UA Trace    Basic metabolic panel [[id number]]  (Abnormal)  Collected: 01/29/24 1650    Lab Status: Final result Specimen: Blood from Arm, Right Updated: 01/29/24 1744     Sodium 134 mmol/L      Potassium 5.0 mmol/L      Chloride 88 mmol/L      CO2 8 mmol/L      ANION GAP 38 mmol/L      BUN 35 mg/dL      Creatinine 1.94 mg/dL      Glucose 802 mg/dL      Calcium 10.1 mg/dL      eGFR 45 ml/min/1.73sq m     Narrative:      National Kidney Disease Foundation guidelines for Chronic Kidney Disease (CKD):     Stage 1 with normal or high GFR (GFR > 90 mL/min/1.73 square meters)    Stage 2 Mild CKD (GFR = 60-89 mL/min/1.73 square meters)    Stage 3A Moderate CKD (GFR = 45-59 mL/min/1.73 square meters)    Stage 3B Moderate CKD (GFR = 30-44 mL/min/1.73 square meters)    Stage 4 Severe CKD (GFR = 15-29 mL/min/1.73 square meters)    Stage 5 End Stage CKD (GFR <15 mL/min/1.73 square meters)  Note: GFR calculation is accurate only with a steady state creatinine    Comprehensive metabolic panel [[id number]]  (Abnormal) Collected: 01/29/24 1650    Lab Status: Final result Specimen: Blood from Arm, Right Updated: 01/29/24 1744     Sodium 133 mmol/L      Potassium 5.0 mmol/L      Chloride 88 mmol/L      CO2 7 mmol/L      ANION GAP 38 mmol/L      BUN 36 mg/dL      Creatinine 1.94 mg/dL      Glucose 803 mg/dL      Calcium 10.1 mg/dL      AST 19 U/L      ALT 19 U/L      Alkaline Phosphatase 90 U/L      Total Protein 8.9 g/dL      Albumin 4.8 g/dL      Total Bilirubin 1.47 mg/dL      eGFR 45 ml/min/1.73sq m     Narrative:      National Kidney Disease Foundation guidelines for Chronic Kidney Disease (CKD):     Stage 1 with normal or high GFR (GFR > 90 mL/min/1.73 square meters)    Stage 2 Mild CKD (GFR = 60-89 mL/min/1.73 square meters)    Stage 3A Moderate CKD (GFR = 45-59 mL/min/1.73 square meters)    Stage 3B Moderate CKD (GFR = 30-44 mL/min/1.73 square meters)    Stage 4 Severe CKD (GFR = 15-29 mL/min/1.73 square meters)    Stage 5 End Stage CKD (GFR <15 mL/min/1.73 square meters)  Note: GFR  calculation is accurate only with a steady state creatinine    Basic metabolic panel [[id number]]  (Abnormal) Collected: 01/29/24 1650    Lab Status: Final result Specimen: Blood from Arm, Right Updated: 01/29/24 1743     Sodium 133 mmol/L      Potassium 5.0 mmol/L      Chloride 88 mmol/L      CO2 7 mmol/L      ANION GAP 38 mmol/L      BUN 36 mg/dL      Creatinine 1.94 mg/dL      Glucose 803 mg/dL      Calcium 10.1 mg/dL      eGFR 45 ml/min/1.73sq m     Narrative:      National Kidney Disease Foundation guidelines for Chronic Kidney Disease (CKD):     Stage 1 with normal or high GFR (GFR > 90 mL/min/1.73 square meters)    Stage 2 Mild CKD (GFR = 60-89 mL/min/1.73 square meters)    Stage 3A Moderate CKD (GFR = 45-59 mL/min/1.73 square meters)    Stage 3B Moderate CKD (GFR = 30-44 mL/min/1.73 square meters)    Stage 4 Severe CKD (GFR = 15-29 mL/min/1.73 square meters)    Stage 5 End Stage CKD (GFR <15 mL/min/1.73 square meters)  Note: GFR calculation is accurate only with a steady state creatinine    Magnesium [[id number]]  (Normal) Collected: 01/29/24 1650    Lab Status: Final result Specimen: Blood from Arm, Right Updated: 01/29/24 1741     Magnesium 2.4 mg/dL     Phosphorus [[id number]]  (Abnormal) Collected: 01/29/24 1650    Lab Status: Final result Specimen: Blood from Arm, Right Updated: 01/29/24 1741     Phosphorus 10.5 mg/dL     Lactic acid, plasma (w/reflex if result > 2.0) [[id number]]  (Abnormal) Collected: 01/29/24 1650    Lab Status: Final result Specimen: Blood from Arm, Right Updated: 01/29/24 1730     LACTIC ACID 7.8 mmol/L     Narrative:      Result may be elevated if tourniquet was used during collection.    Magnesium [[id number]]  (Normal) Collected: 01/29/24 1650    Lab Status: Final result Specimen: Blood from Arm, Right Updated: 01/29/24 1728     Magnesium 2.3 mg/dL     Phosphorus [[id number]]  (Abnormal) Collected: 01/29/24 1650    Lab Status: Final result Specimen: Blood from Arm, Right Updated:  01/29/24 1728     Phosphorus 10.4 mg/dL     Beta Hydroxybutyrate [[id number]]  (Abnormal) Collected: 01/29/24 1650    Lab Status: Final result Specimen: Blood from Arm, Right Updated: 01/29/24 1713     BETA-HYDROXYBUTYRATE 5.2 mmol/L     Blood gas, venous [[id number]]  (Abnormal) Collected: 01/29/24 1650    Lab Status: Final result Specimen: Blood from Arm, Right Updated: 01/29/24 1712     pH, Juan 6.982     pCO2, Juan 30.1 mm Hg      pO2, Juan 49.0 mm Hg      HCO3, Juan 7.0 mmol/L      Base Excess, Juan -23.8 mmol/L      O2 Content, Juan 17.8 ml/dL      O2 HGB, VENOUS 74.8 %     CBC [[id number]]  (Abnormal) Collected: 01/29/24 1650    Lab Status: Final result Specimen: Blood from Arm, Right Updated: 01/29/24 1710     WBC 36.38 Thousand/uL      RBC 5.73 Million/uL      Hemoglobin 16.0 g/dL      Hematocrit 50.5 %      MCV 88 fL      MCH 27.9 pg      MCHC 31.7 g/dL      RDW 12.2 %      Platelets 324 Thousands/uL      MPV 12.6 fL     Fingerstick Glucose (POCT) [[id number]]  (Abnormal) Collected: 01/29/24 1643    Lab Status: Final result Updated: 01/29/24 1651     POC Glucose >500 mg/dl     POCT Blood Gas (CG8+) [[id number]]  (Abnormal) Collected: 01/29/24 1637    Lab Status: Final result Specimen: Venous Updated: 01/29/24 1640     ph, Juan ISTAT 7.068     pCO2, Juan i-STAT 24.9 mm HG      pO2, Juan i-STAT 29.0 mm HG      BE, i-STAT -22 mmol/L      HCO3, Juan i-STAT 7.2 mmol/L      CO2, i-STAT 8 mmol/L      O2 Sat, i-STAT 35 %      SODIUM, I-STAT 132 mmol/l      Potassium, i-STAT 5.1 mmol/L      Calcium, Ionized i-STAT 1.12 mmol/L      Hct, i-STAT 52 %      Hgb, i-STAT 17.7 g/dl      Glucose, i-STAT >600 mg/dl      Specimen Type VENOUS                   XR chest portable ICU   Final Result by Korey Gonzalez MD (01/31 0840)      No acute cardiopulmonary disease.                  Workstation performed: [id number]                    Procedures  ECG 12 Lead Documentation Only    Date/Time: 1/29/2024 4:45 PM    Performed by: Aaron Calvo  DO Liza  Authorized by: Aaron De Jesus DO    Indications / Diagnosis:  DKA  ECG reviewed by me, the ED Provider: yes    Patient location:  ED  Previous ECG:     Previous ECG:  Unavailable  Interpretation:     Interpretation: normal    Rate:     ECG rate:  121    ECG rate assessment: tachycardic    Rhythm:     Rhythm: sinus tachycardia    Ectopy:     Ectopy: none    QRS:     QRS axis:  Normal    QRS intervals:  Normal  Conduction:     Conduction: normal    ST segments:     ST segments:  Normal  T waves:     T waves: peaked      Peaked:  V3, V4 and V5  CriticalCare Time    Date/Time: 1/29/2024 5:05 PM    Performed by: Aaron De Jesus DO  Authorized by: Aaron De Jesus DO    Critical care provider statement:     Critical care time (minutes):  42    Critical care time was exclusive of:  Separately billable procedures and treating other patients and teaching time    Critical care was necessary to treat or prevent imminent or life-threatening deterioration of the following conditions:  Endocrine crisis and metabolic crisis    Critical care was time spent personally by me on the following activities:  Blood draw for specimens, obtaining history from patient or surrogate, development of treatment plan with patient or surrogate, discussions with consultants, evaluation of patient's response to treatment, examination of patient, ordering and performing treatments and interventions, ordering and review of laboratory studies, ordering and review of radiographic studies, re-evaluation of patient's condition, review of old charts and interpretation of cardiac output measurements    I assumed direction of critical care for this patient from another provider in my specialty: no             ED Course  ED Course as of 02/01/24 1059   Mon Jan 29, 2024   0878 Critical care paged   [phone] Discussed case with Dr. Avelar and Olivia Hwang from critical care.  Critical care at bedside.  Agree with need for admission to  critical care service                            Initial Sepsis Screening       Row Name 01/29/24 1813                Is the patient's history suggestive of a new or worsening infection? No  -ES                  User Key  (r) = Recorded By, (t) = Taken By, (c) = Cosigned By      Initials Name Provider Type    REBECA Pendleton Nurse Practitioner                                  Medical Decision Making  Patient presents with clinical signs and symptoms consistent with severe DKA. Patient tachycardia and tachypneic with strong ketone on breath. Plan for IV fluids and DKA labs.  Noted significant acidemia on VBG, will start insulin gtt.    Discussed with critical care who accepts to their service. Patient improving upon Xfer to ICU    Amount and/or Complexity of Data Reviewed  External Data Reviewed: notes.  Labs: ordered.    Risk  Prescription drug management.  Decision regarding hospitalization.             Disposition  Final diagnoses:   DKA, type 1 (HCC)     Time reflects when diagnosis was documented in both MDM as applicable and the Disposition within this note       Time User Action Codes Description Comment    1/29/2024  5:05 PM Aaron De Jesus Add [E10.10] DKA, type 1 (HCC)           ED Disposition       ED Disposition   Admit    Condition   Stable    Date/Time   Mon Jan 29, 2024  5:05 PM    Comment   Case was discussed with Valentino and the patient's admission status was agreed to be Admission Status: inpatient status to the service of Dr. Avelar .               Follow-up Information    None         Current Discharge Medication List        CONTINUE these medications which have NOT CHANGED    Details   insulin NPH-insulin regular (NovoLIN 70/30) 100 units/mL subcutaneous injection Inject under the skin 3 (three) times a day             No discharge procedures on file.    PDMP Review       None            ED Provider  Electronically Signed by             Aaron De Jesus DO  02/01/24 1357

## 2024-01-29 NOTE — SEPSIS NOTE
Sepsis Note   Jaun Loza III 28 y.o. male MRN: [id number]  Unit/Bed#: -01 Encounter: [id number]      SIRS likely 2/2 DKA -- no acute infection suspected     Initial Sepsis Screening       Row Name 01/29/24 1813                Is the patient's history suggestive of a new or worsening infection? No  -ES                  User Key  (r) = Recorded By, (t) = Taken By, (c) = Cosigned By      Initials Name Provider Type    ES REBECA Tillman Nurse Practitioner                        Body mass index is 21.1 kg/m².  Wt Readings from Last 1 Encounters:   01/29/24 59.3 kg (130 lb 11.7 oz)     IBW (Ideal Body Weight): 63.8 kg    Ideal body weight: 63.8 kg (140 lb 10.5 oz)

## 2024-01-29 NOTE — H&P
Former[facility]  H&P  Name: Jaun Loza III 28 y.o. male I MRN: [id number]  Unit/Bed#: ED 10 I Date of Admission: 1/29/2024   Date of Service: 1/29/2024 I Hospital Day: 0      Assessment/Plan   * DKA, type 1 (HCC)  Assessment & Plan  Lab Results   Component Value Date    HGBA1C 10.9 (H) 05/20/2022       Recent Labs     01/29/24  1643   POCGLU >500*         Blood Sugar Average: Last 72 hrs:    Hx of T1DM on SQ insulin regimen  Pt reports running out of insulin needles 2 days ago. He has since had progressive N/V/D and polydipsia  DKA POA AEB: pH 6.98, CO2 7, AG 38, , BHB 5.2  Initiate DKA protocol  IVF per protocol; transition to dextrose-containing IVF when BG < 250  DKA insulin gtt; transition to non-DKA insulin gtt when AG closed x 2  Q1h BG   Q4h bmp, mag, and phos  NPO  Check Hgb A1C    Lactic acidosis  Assessment & Plan  Lactic 7.8 on admission  Secondary to severe DKA  Continue IVF resuscitation per DKA protocol  Trend lactic until cleared    SHREYAS (acute kidney injury) ([facility])  Assessment & Plan  Baseline Cr 0.9 in care everywhere  Cr on admission 1.94  UA bland  Likely prerenal 2/2 severe DKA   Aggressive IVF resuscitation   Trend renal indices  Strict I/O     N&V (nausea and vomiting)  Assessment & Plan  Pt reports 2 days of N/V/D   Denies abdominal pain  Likely secondary to severe DKA  Zofran ordered PRN, may require Reglan    Hyperphosphatemia  Assessment & Plan  Phos 10.4 on admission  Likely secondary to SHREYAS and severe hypovolemia  Trend q4h with IVF resuscitation    SIRS (systemic inflammatory response syndrome) ([facility])  Assessment & Plan  SIRS: tachycardia, tachypnea, leukocytosis  SIRS likely secondary to DKA, unlikely infection  Check UA  Monitor off abx  Trend fever curve and WBC count    Hyponatremia  Assessment & Plan  Pseudohyponatremia in the setting of hyperglycemia  Corrected sodium 144    Hx of seizure disorder  Assessment & Plan  Pt reports seizures as a  child but has not had one in many years. Not on AED outpatient           History of Present Illness     HPI: Jaun Loza III is a 28 y.o. male with PMHx significant for T1DM and remote seizure hx not on AEDs who presented to the ED with hyperglycemia after running out of his insulin needles 2 days ago. Pt reports he last took insulin 2 days ago and has had progressive N/V/D, polydipsia, and tachypnea since that time. Denies any abdominal pain, urinary symptoms, fevers, or chills. DKA POA AEB pH 6.98, CO2 7, AG 38, , BHB 5.2 . He will be admitted to critical care service for further management.    History obtained from chart review and the patient.  Review of Systems   Constitutional:  Positive for appetite change. Negative for chills and fever.   Respiratory:  Negative for shortness of breath.    Cardiovascular:  Negative for chest pain and leg swelling.   Gastrointestinal:  Positive for diarrhea, nausea and vomiting. Negative for abdominal distention, abdominal pain, blood in stool and constipation.   Genitourinary:  Negative for dysuria and frequency.   Neurological:  Negative for dizziness, weakness, light-headedness and headaches.   All other systems reviewed and are negative.    Disposition: Critical care  Historical Information   Past Medical History:  No date: Type 1 diabetes mellitus (HCC) No past surgical history on file.   No current outpatient medications No Known Allergies     History reviewed. No pertinent family history.       Objective                            Vitals I/O      Most Recent Min/Max in 24hrs   Temp (!) 97.1 °F (36.2 °C) Temp  Min: 97.1 °F (36.2 °C)  Max: 97.1 °F (36.2 °C)   Pulse (!) 117 Pulse  Min: 116  Max: 120   Resp (!) 50 Resp  Min: 46  Max: 60   /61 BP  Min: 125/61  Max: 137/73   O2 Sat 99 % SpO2  Min: 95 %  Max: 99 %      Intake/Output Summary (Last 24 hours) at 1/29/2024 7252  Last data filed at 1/29/2024 1731  Gross per 24 hour   Intake --   Output 400 ml    Net -400 ml       Diet NPO    Invasive Monitoring           Physical Exam   Physical Exam  Vitals reviewed.   Eyes:      Extraocular Movements: Extraocular movements intact.      Pupils: Pupils are equal, round, and reactive to light.   Skin:     General: Skin is warm and dry.      Capillary Refill: Capillary refill takes more than 3 seconds.      Coloration: Skin is pale.   HENT:      Head: Normocephalic and atraumatic.      Mouth/Throat:      Mouth: Mucous membranes are dry.   Cardiovascular:      Rate and Rhythm: Regular rhythm. Tachycardia present.      Heart sounds: No murmur heard.     No friction rub. No gallop.   Musculoskeletal:      Right lower leg: No edema.      Left lower leg: No edema.   Abdominal: General: There is no distension.      Palpations: Abdomen is soft.      Tenderness: There is no abdominal tenderness.   Constitutional:       Appearance: He is ill-appearing.      Comments: Appears underweight   Pulmonary:      Effort: Tachypnea present. No accessory muscle usage, respiratory distress or accessory muscle usage.      Breath sounds: No wheezing, rhonchi or rales.   Neurological:      General: No focal deficit present.      Mental Status: He is alert and oriented to person, place and time. Mental status is at baseline.      Motor: Strength full and intact in all extremities.            Diagnostic Studies      EKG: Sinus tachcyardia  Imaging:  I have personally reviewed pertinent reports.       Medications:  Scheduled PRN   chlorhexidine, 15 mL, Q12H MARTHA      ondansetron, 4 mg, Q4H PRN  sodium chloride (PF), 3 mL, Q1H PRN       Continuous    dextrose 5 % and sodium chloride 0.45 %, 250 mL/hr  insulin regular (HumuLIN R,NovoLIN R) 1 Units/mL in sodium chloride 0.9 % 100 mL infusion, 0.1-30 Units/hr, Last Rate: 5.9 Units/hr (01/29/24 1716)  lactated ringers, 500 mL/hr   Followed by  lactated ringers, 250 mL/hr         Labs:    CBC    Recent Labs     01/29/24  1637 01/29/24  1650   WBC  --   36.38*   HGB 17.7* 16.0   HCT 52* 50.5*   PLT  --  324     BMP    Recent Labs     01/29/24  1637 01/29/24  1650   SODIUM  --  134*  133*  133*   K  --  5.0  5.0  5.0   CL  --  88*  88*  88*   CO2 8* 8*  7*  7*   AGAP  --  38  38  38   BUN  --  35*  36*  36*   CREATININE  --  1.94*  1.94*  1.94*   CALCIUM  --  10.1  10.1  10.1       Coags    No recent results     Additional Electrolytes  Recent Labs     01/29/24  1637 01/29/24  1650   MG  --  2.4  2.3   PHOS  --  10.5*  10.4*   CAIONIZED 1.12  --           Blood Gas    No recent results  Recent Labs     01/29/24  1650   PHVEN 6.982*   WAS2RWA 30.1*   PO2VEN 49.0*   XCS1VRP 7.0*   BEVEN -23.8   C1OFKTO 74.8    LFTs  Recent Labs     01/29/24  1650   ALT 19   AST 19   ALKPHOS 90   ALB 4.8   TBILI 1.47*       Infectious  No recent results  Glucose  Recent Labs     01/29/24  1650   GLUC 802*  803*  803*             Anticipated Length of Stay is > 2 midnights  Olivia Maya PA-C       Prophylactic measure

## 2024-01-29 NOTE — ASSESSMENT & PLAN NOTE
SIRS: tachycardia, tachypnea, leukocytosis  SIRS likely secondary to DKA, unlikely infection  Check UA  Monitor off abx  Trend fever curve and WBC count

## 2024-01-29 NOTE — ASSESSMENT & PLAN NOTE
Pt reports seizures as a child but has not had one in many years. Not on AED outpatient  Seizure precautions

## 2024-01-29 NOTE — ASSESSMENT & PLAN NOTE
Lab Results   Component Value Date    HGBA1C 10.9 (H) 05/20/2022       Recent Labs     01/29/24  1643   POCGLU >500*       Blood Sugar Average: Last 72 hrs:    Hx of T1DM on SQ insulin regimen  Pt reports running out of insulin needles 2 days ago. He has since had progressive N/V/D and polydipsia  DKA POA AEB: pH 6.98, CO2 , AG , BG , BHB 5.2  Initiate DKA protocol  IVF per protocol; transition to dextrose-containing IVF when BG < 250  DKA insulin gtt; transition to non-DKA insulin gtt when AG closed x 2  Q1h BG   Q4h bmp, mag, and phos  NPO  Check Hgb A1C

## 2024-01-29 NOTE — ED NOTES
Pt vomiting informed unable to have ice chips while vomiting     Elayne Lacy, CHARLI  01/29/24 1387

## 2024-01-29 NOTE — ASSESSMENT & PLAN NOTE
Phos 10.4 on admission  Likely secondary to SHREYAS and severe hypovolemia  Trend q4h with IVF resuscitation

## 2024-01-29 NOTE — ASSESSMENT & PLAN NOTE
Pt reports 2 days of N/V/D   Denies abdominal pain  Likely secondary to severe DKA  Zofran ordered PRN, may require Reglan  Check baseline EKG

## 2024-01-29 NOTE — ASSESSMENT & PLAN NOTE
Pt reports 2 days of N/V/D   Denies abdominal pain  Likely secondary to severe DKA  Zofran ordered PRN, may require Reglan   Oriented - self; Oriented - place; Oriented - time

## 2024-01-30 ENCOUNTER — APPOINTMENT (INPATIENT)
Dept: RADIOLOGY | Facility: HOSPITAL | Age: 29
DRG: 420 | End: 2024-01-30
Payer: MEDICARE

## 2024-01-30 LAB
ANION GAP SERPL CALCULATED.3IONS-SCNC: 11 MMOL/L
ANION GAP SERPL CALCULATED.3IONS-SCNC: 12 MMOL/L
ANION GAP SERPL CALCULATED.3IONS-SCNC: 17 MMOL/L
ANION GAP SERPL CALCULATED.3IONS-SCNC: 9 MMOL/L
BASOPHILS # BLD AUTO: 0.06 THOUSANDS/ÂΜL (ref 0–0.1)
BASOPHILS NFR BLD AUTO: 0 % (ref 0–1)
BUN SERPL-MCNC: 16 MG/DL (ref 5–25)
BUN SERPL-MCNC: 21 MG/DL (ref 5–25)
BUN SERPL-MCNC: 22 MG/DL (ref 5–25)
BUN SERPL-MCNC: 27 MG/DL (ref 5–25)
CALCIUM SERPL-MCNC: 10.1 MG/DL (ref 8.4–10.2)
CALCIUM SERPL-MCNC: 8.3 MG/DL (ref 8.4–10.2)
CALCIUM SERPL-MCNC: 8.5 MG/DL (ref 8.4–10.2)
CALCIUM SERPL-MCNC: 9.4 MG/DL (ref 8.4–10.2)
CHLORIDE SERPL-SCNC: 106 MMOL/L (ref 96–108)
CHLORIDE SERPL-SCNC: 108 MMOL/L (ref 96–108)
CHLORIDE SERPL-SCNC: 108 MMOL/L (ref 96–108)
CHLORIDE SERPL-SCNC: 109 MMOL/L (ref 96–108)
CO2 SERPL-SCNC: 15 MMOL/L (ref 21–32)
CO2 SERPL-SCNC: 20 MMOL/L (ref 21–32)
CO2 SERPL-SCNC: 23 MMOL/L (ref 21–32)
CO2 SERPL-SCNC: 24 MMOL/L (ref 21–32)
CREAT SERPL-MCNC: 0.93 MG/DL (ref 0.6–1.3)
CREAT SERPL-MCNC: 1.11 MG/DL (ref 0.6–1.3)
CREAT SERPL-MCNC: 1.34 MG/DL (ref 0.6–1.3)
CREAT SERPL-MCNC: 1.46 MG/DL (ref 0.6–1.3)
EOSINOPHIL # BLD AUTO: 0 THOUSAND/ÂΜL (ref 0–0.61)
EOSINOPHIL NFR BLD AUTO: 0 % (ref 0–6)
ERYTHROCYTE [DISTWIDTH] IN BLOOD BY AUTOMATED COUNT: 12 % (ref 11.6–15.1)
GFR SERPL CREATININE-BSD FRML MDRD: 109 ML/MIN/1.73SQ M
GFR SERPL CREATININE-BSD FRML MDRD: 63 ML/MIN/1.73SQ M
GFR SERPL CREATININE-BSD FRML MDRD: 70 ML/MIN/1.73SQ M
GFR SERPL CREATININE-BSD FRML MDRD: 88 ML/MIN/1.73SQ M
GLUCOSE SERPL-MCNC: 138 MG/DL (ref 65–140)
GLUCOSE SERPL-MCNC: 138 MG/DL (ref 65–140)
GLUCOSE SERPL-MCNC: 149 MG/DL (ref 65–140)
GLUCOSE SERPL-MCNC: 153 MG/DL (ref 65–140)
GLUCOSE SERPL-MCNC: 153 MG/DL (ref 65–140)
GLUCOSE SERPL-MCNC: 158 MG/DL (ref 65–140)
GLUCOSE SERPL-MCNC: 167 MG/DL (ref 65–140)
GLUCOSE SERPL-MCNC: 167 MG/DL (ref 65–140)
GLUCOSE SERPL-MCNC: 172 MG/DL (ref 65–140)
GLUCOSE SERPL-MCNC: 179 MG/DL (ref 65–140)
GLUCOSE SERPL-MCNC: 184 MG/DL (ref 65–140)
GLUCOSE SERPL-MCNC: 194 MG/DL (ref 65–140)
GLUCOSE SERPL-MCNC: 213 MG/DL (ref 65–140)
GLUCOSE SERPL-MCNC: 230 MG/DL (ref 65–140)
GLUCOSE SERPL-MCNC: 233 MG/DL (ref 65–140)
GLUCOSE SERPL-MCNC: 285 MG/DL (ref 65–140)
GLUCOSE SERPL-MCNC: 289 MG/DL (ref 65–140)
GLUCOSE SERPL-MCNC: 86 MG/DL (ref 65–140)
GLUCOSE SERPL-MCNC: 86 MG/DL (ref 65–140)
GLUCOSE SERPL-MCNC: 90 MG/DL (ref 65–140)
GLUCOSE SERPL-MCNC: 98 MG/DL (ref 65–140)
HCT VFR BLD AUTO: 42.5 % (ref 36.5–49.3)
HGB BLD-MCNC: 14.7 G/DL (ref 12–17)
IMM GRANULOCYTES # BLD AUTO: 0.41 THOUSAND/UL (ref 0–0.2)
IMM GRANULOCYTES NFR BLD AUTO: 1 % (ref 0–2)
LACTATE SERPL-SCNC: 1.9 MMOL/L (ref 0.5–2)
LIPASE SERPL-CCNC: 29 U/L (ref 11–82)
LYMPHOCYTES # BLD AUTO: 2.28 THOUSANDS/ÂΜL (ref 0.6–4.47)
LYMPHOCYTES NFR BLD AUTO: 8 % (ref 14–44)
MAGNESIUM SERPL-MCNC: 1.9 MG/DL (ref 1.9–2.7)
MAGNESIUM SERPL-MCNC: 2 MG/DL (ref 1.9–2.7)
MAGNESIUM SERPL-MCNC: 2.1 MG/DL (ref 1.9–2.7)
MAGNESIUM SERPL-MCNC: 2.3 MG/DL (ref 1.9–2.7)
MCH RBC QN AUTO: 27.6 PG (ref 26.8–34.3)
MCHC RBC AUTO-ENTMCNC: 34.6 G/DL (ref 31.4–37.4)
MCV RBC AUTO: 80 FL (ref 82–98)
MONOCYTES # BLD AUTO: 1.98 THOUSAND/ÂΜL (ref 0.17–1.22)
MONOCYTES NFR BLD AUTO: 7 % (ref 4–12)
NEUTROPHILS # BLD AUTO: 24.08 THOUSANDS/ÂΜL (ref 1.85–7.62)
NEUTS SEG NFR BLD AUTO: 84 % (ref 43–75)
NRBC BLD AUTO-RTO: 0 /100 WBCS
PHOSPHATE SERPL-MCNC: 2 MG/DL (ref 2.7–4.5)
PHOSPHATE SERPL-MCNC: 4.4 MG/DL (ref 2.7–4.5)
PHOSPHATE SERPL-MCNC: 4.7 MG/DL (ref 2.7–4.5)
PHOSPHATE SERPL-MCNC: <1 MG/DL (ref 2.7–4.5)
PLATELET # BLD AUTO: 274 THOUSANDS/UL (ref 149–390)
PMV BLD AUTO: 11.1 FL (ref 8.9–12.7)
POTASSIUM SERPL-SCNC: 3.5 MMOL/L (ref 3.5–5.3)
POTASSIUM SERPL-SCNC: 3.5 MMOL/L (ref 3.5–5.3)
POTASSIUM SERPL-SCNC: 4.3 MMOL/L (ref 3.5–5.3)
POTASSIUM SERPL-SCNC: 5.3 MMOL/L (ref 3.5–5.3)
RBC # BLD AUTO: 5.32 MILLION/UL (ref 3.88–5.62)
SODIUM SERPL-SCNC: 138 MMOL/L (ref 135–147)
SODIUM SERPL-SCNC: 140 MMOL/L (ref 135–147)
SODIUM SERPL-SCNC: 141 MMOL/L (ref 135–147)
SODIUM SERPL-SCNC: 143 MMOL/L (ref 135–147)
WBC # BLD AUTO: 28.81 THOUSAND/UL (ref 4.31–10.16)

## 2024-01-30 PROCEDURE — 83735 ASSAY OF MAGNESIUM: CPT | Performed by: PHYSICIAN ASSISTANT

## 2024-01-30 PROCEDURE — 82948 REAGENT STRIP/BLOOD GLUCOSE: CPT

## 2024-01-30 PROCEDURE — 80048 BASIC METABOLIC PNL TOTAL CA: CPT | Performed by: PHYSICIAN ASSISTANT

## 2024-01-30 PROCEDURE — 84100 ASSAY OF PHOSPHORUS: CPT | Performed by: PHYSICIAN ASSISTANT

## 2024-01-30 PROCEDURE — 85025 COMPLETE CBC W/AUTO DIFF WBC: CPT | Performed by: PHYSICIAN ASSISTANT

## 2024-01-30 PROCEDURE — 83605 ASSAY OF LACTIC ACID: CPT | Performed by: NURSE PRACTITIONER

## 2024-01-30 PROCEDURE — NC001 PR NO CHARGE: Performed by: PHYSICIAN ASSISTANT

## 2024-01-30 PROCEDURE — NC001 PR NO CHARGE: Performed by: NURSE PRACTITIONER

## 2024-01-30 PROCEDURE — 71045 X-RAY EXAM CHEST 1 VIEW: CPT

## 2024-01-30 PROCEDURE — 83690 ASSAY OF LIPASE: CPT | Performed by: PHYSICIAN ASSISTANT

## 2024-01-30 PROCEDURE — 99232 SBSQ HOSP IP/OBS MODERATE 35: CPT | Performed by: INTERNAL MEDICINE

## 2024-01-30 RX ORDER — SODIUM CHLORIDE, SODIUM GLUCONATE, SODIUM ACETATE, POTASSIUM CHLORIDE, MAGNESIUM CHLORIDE, SODIUM PHOSPHATE, DIBASIC, AND POTASSIUM PHOSPHATE .53; .5; .37; .037; .03; .012; .00082 G/100ML; G/100ML; G/100ML; G/100ML; G/100ML; G/100ML; G/100ML
500 INJECTION, SOLUTION INTRAVENOUS ONCE
Status: COMPLETED | OUTPATIENT
Start: 2024-01-30 | End: 2024-01-30

## 2024-01-30 RX ORDER — MAGNESIUM SULFATE HEPTAHYDRATE 40 MG/ML
2 INJECTION, SOLUTION INTRAVENOUS ONCE
Status: COMPLETED | OUTPATIENT
Start: 2024-01-30 | End: 2024-01-30

## 2024-01-30 RX ORDER — SODIUM CHLORIDE, SODIUM GLUCONATE, SODIUM ACETATE, POTASSIUM CHLORIDE, MAGNESIUM CHLORIDE, SODIUM PHOSPHATE, DIBASIC, AND POTASSIUM PHOSPHATE .53; .5; .37; .037; .03; .012; .00082 G/100ML; G/100ML; G/100ML; G/100ML; G/100ML; G/100ML; G/100ML
100 INJECTION, SOLUTION INTRAVENOUS CONTINUOUS
Status: DISCONTINUED | OUTPATIENT
Start: 2024-01-30 | End: 2024-02-01

## 2024-01-30 RX ORDER — ACETAMINOPHEN 325 MG/1
650 TABLET ORAL EVERY 6 HOURS PRN
Status: DISCONTINUED | OUTPATIENT
Start: 2024-01-30 | End: 2024-02-02 | Stop reason: HOSPADM

## 2024-01-30 RX ORDER — METOCLOPRAMIDE HYDROCHLORIDE 5 MG/ML
10 INJECTION INTRAMUSCULAR; INTRAVENOUS ONCE
Status: COMPLETED | OUTPATIENT
Start: 2024-01-30 | End: 2024-01-30

## 2024-01-30 RX ORDER — POTASSIUM CHLORIDE 20 MEQ/1
40 TABLET, EXTENDED RELEASE ORAL ONCE
Status: COMPLETED | OUTPATIENT
Start: 2024-01-30 | End: 2024-01-30

## 2024-01-30 RX ADMIN — ACETAMINOPHEN 325MG 650 MG: 325 TABLET ORAL at 07:47

## 2024-01-30 RX ADMIN — TRIMETHOBENZAMIDE HYDROCHLORIDE 200 MG: 100 INJECTION INTRAMUSCULAR at 21:03

## 2024-01-30 RX ADMIN — METOCLOPRAMIDE 10 MG: 5 INJECTION, SOLUTION INTRAMUSCULAR; INTRAVENOUS at 02:18

## 2024-01-30 RX ADMIN — CHLORHEXIDINE GLUCONATE 15 ML: 1.2 SOLUTION ORAL at 21:03

## 2024-01-30 RX ADMIN — CHLORHEXIDINE GLUCONATE 15 ML: 1.2 SOLUTION ORAL at 09:54

## 2024-01-30 RX ADMIN — SODIUM PHOSPHATE, MONOBASIC, MONOHYDRATE AND SODIUM PHOSPHATE, DIBASIC, ANHYDROUS 30 MMOL: 276; 142 INJECTION, SOLUTION INTRAVENOUS at 10:01

## 2024-01-30 RX ADMIN — POTASSIUM CHLORIDE 40 MEQ: 1500 TABLET, EXTENDED RELEASE ORAL at 19:55

## 2024-01-30 RX ADMIN — HEPARIN SODIUM 5000 UNITS: 5000 INJECTION INTRAVENOUS; SUBCUTANEOUS at 05:53

## 2024-01-30 RX ADMIN — ONDANSETRON 4 MG: 2 INJECTION INTRAMUSCULAR; INTRAVENOUS at 01:17

## 2024-01-30 RX ADMIN — POTASSIUM PHOSPHATE, MONOBASIC POTASSIUM PHOSPHATE, DIBASIC 30 MMOL: 224; 236 INJECTION, SOLUTION, CONCENTRATE INTRAVENOUS at 09:08

## 2024-01-30 RX ADMIN — HEPARIN SODIUM 5000 UNITS: 5000 INJECTION INTRAVENOUS; SUBCUTANEOUS at 21:03

## 2024-01-30 RX ADMIN — ONDANSETRON 4 MG: 2 INJECTION INTRAMUSCULAR; INTRAVENOUS at 19:26

## 2024-01-30 RX ADMIN — SODIUM CHLORIDE, SODIUM GLUCONATE, SODIUM ACETATE, POTASSIUM CHLORIDE, MAGNESIUM CHLORIDE, SODIUM PHOSPHATE, DIBASIC, AND POTASSIUM PHOSPHATE 100 ML/HR: .53; .5; .37; .037; .03; .012; .00082 INJECTION, SOLUTION INTRAVENOUS at 08:57

## 2024-01-30 RX ADMIN — SODIUM CHLORIDE 0.3 UNITS/HR: 9 INJECTION, SOLUTION INTRAVENOUS at 08:54

## 2024-01-30 RX ADMIN — ONDANSETRON 4 MG: 2 INJECTION INTRAMUSCULAR; INTRAVENOUS at 05:44

## 2024-01-30 RX ADMIN — SODIUM CHLORIDE, SODIUM GLUCONATE, SODIUM ACETATE, POTASSIUM CHLORIDE, MAGNESIUM CHLORIDE, SODIUM PHOSPHATE, DIBASIC, AND POTASSIUM PHOSPHATE 100 ML/HR: .53; .5; .37; .037; .03; .012; .00082 INJECTION, SOLUTION INTRAVENOUS at 23:25

## 2024-01-30 RX ADMIN — HEPARIN SODIUM 5000 UNITS: 5000 INJECTION INTRAVENOUS; SUBCUTANEOUS at 15:09

## 2024-01-30 RX ADMIN — SODIUM CHLORIDE, SODIUM GLUCONATE, SODIUM ACETATE, POTASSIUM CHLORIDE, MAGNESIUM CHLORIDE, SODIUM PHOSPHATE, DIBASIC, AND POTASSIUM PHOSPHATE 500 ML: .53; .5; .37; .037; .03; .012; .00082 INJECTION, SOLUTION INTRAVENOUS at 15:11

## 2024-01-30 RX ADMIN — SODIUM PHOSPHATE, MONOBASIC, MONOHYDRATE AND SODIUM PHOSPHATE, DIBASIC, ANHYDROUS 12 MMOL: 142; 276 INJECTION, SOLUTION INTRAVENOUS at 01:33

## 2024-01-30 RX ADMIN — MAGNESIUM SULFATE HEPTAHYDRATE 2 G: 2 INJECTION, SOLUTION INTRAVENOUS at 15:50

## 2024-01-30 RX ADMIN — DEXTROSE AND SODIUM CHLORIDE 250 ML/HR: 5; .45 INJECTION, SOLUTION INTRAVENOUS at 01:14

## 2024-01-30 NOTE — PROGRESS NOTES
Critical Care Interval Transfer Note:    Please refer to progress note from earlier today for full details.     Barriers to discharge:   Continue insulin gtt for now  Await endocrine consult  Continue to trend electrolytes     Consults: IP CONSULT TO CASE MANAGEMENT  IP CONSULT TO ENDOCRINOLOGY    Recommended to review admission imaging for incidental findings and document in discharge navigator: Chart reviewed, no known incidental findings noted at this time.      Discharge Plan: Anticipate discharge tomorrow to home.            Patient seen and evaluated by Critical Care today and deemed to be appropriate for transfer to Stepdown Level 2. Spoke to Dr. Salinas from [facility] to accept transfer. Critical care can be contacted via [employer] with any questions or concerns.

## 2024-01-30 NOTE — CASE MANAGEMENT
Case Management Assessment & Discharge Planning Note    Patient name Jaun Loza III  Location /-01 MRN [id number]  : 1995 Date 2024       Current Admission Date: 2024  Current Admission Diagnosis:DKA, type 1 (HCC)   Patient Active Problem List    Diagnosis Date Noted    DKA, type 1 (HCC) 2024    Hx of seizure disorder 2024    N&V (nausea and vomiting) 2024    Hyponatremia 2024    SIRS (systemic inflammatory response syndrome) (HCC) 2024    Lactic acidosis 2024    SHREYAS (acute kidney injury) ([facility]) 2024    Hyperphosphatemia 2024    Cardiac complication 2023    Migraine without aura 2023    Depression 2022    Oppositional defiant disorder 2009      LOS (days): 1  Geometric Mean LOS (GMLOS) (days):   Days to GMLOS:     OBJECTIVE:    Risk of Unplanned Readmission Score: 9.96         Current admission status: Inpatient       Preferred Pharmacy:   [facility]/pharmacy #[id number] [place] [id number]  Phone: [phone] Fax: [phone]    Primary Care Provider: Andry Mcdonald DO    Primary Insurance: [email or web address] [facility]  Secondary Insurance:     ASSESSMENT:  Active Health Care Proxies    There are no active Health Care Proxies on file.       Readmission Root Cause  30 Day Readmission: No    Patient Information  Admitted from:: Home  Mental Status: Alert  During Assessment patient was accompanied by: Not accompanied during assessment  Assessment information provided by:: Patient  Primary Caregiver: Self  Support Systems: Self, Spouse/significant other, Family members  County of Residence: [place]  What [place] you [place] in[place]  Home entry access options. Select all that apply.: Stairs  Number of steps to enter home.: One Flight  Do the steps have railings?: Yes  Type of Current Residence: Apartment  Floor Level: 2  Upon entering residence, is there a  bedroom on the main floor (no further steps)?: Yes  Upon entering residence, is there a bathroom on the main floor (no further steps)?: Yes  Living Arrangements: Lives w/ Spouse/significant other  Is patient a ?: No    Activities of Daily Living Prior to Admission  Functional Status: Independent  Completes ADLs independently?: Yes  Ambulates independently?: Yes  Does patient use assisted devices?: Yes  Assisted Devices (DME) used: Other (Comment) (glucometer, insulin needles, pens)  Does patient currently own DME?: Yes  What DME does the patient currently own?: Other (Comment) (glucometer)  Does patient have a history of Outpatient Therapy (PT/OT)?: No  Does the patient have a history of Short-Term Rehab?: No  Does patient have a history of HHC?: No  Does patient currently have HHC?: No    Patient Information Continued  Income Source: Employed  Does patient have prescription coverage?: Yes  Does patient receive dialysis treatments?: No  Does patient have a history of substance abuse?: No  Does patient have a history of Mental Health Diagnosis?:  (depression, oppositional definat disorder)  Has patient received inpatient treatment related to mental health in the last 2 years?: No         Means of Transportation  Means of Transport to Appts:: Drives Self      Housing Stability: Low Risk  (1/30/2024)    Housing Stability Vital Sign     Unable to Pay for Housing in the Last Year: No     Number of Places Lived in the Last Year: 1     Unstable Housing in the Last Year: No   Food Insecurity: No Food Insecurity (1/30/2024)    Hunger Vital Sign     Worried About Running Out of Food in the Last Year: Never true     Ran Out of Food in the Last Year: Never true   Transportation Needs: No Transportation Needs (1/30/2024)    PRAPARE - Transportation     Lack of Transportation (Medical): No     Lack of Transportation (Non-Medical): No   Utilities: Not At Risk (1/30/2024)    [facility] Utilities     Threatened with loss of  utilities: No       DISCHARGE DETAILS:  Additional Comments: Met with Pt. Pt presents AA&Ox3. Discussed role of case management. Pt lives with girlfriend in 2nd floor apartment, flight of steps to 2nd floor. Independent PTA. Has glucometer. Reports uses insulin needles and pens. Pt reports he is able to afford medications and insulin. Pt reports he did not get his insulin because he was not feeling well.  Denies hx of VNA/SNF/D&A. [facility].  Pt reports he is employed. Drives,grocery shopping. Pt reports his plan is to return home and declines discharge services. Pt reports his SO will transport home.

## 2024-01-30 NOTE — PROCEDURES
US Guided Peripheral IV    Date/Time: 1/30/2024 12:12 PM    Performed by: Olivia Maya PA-C  Authorized by: Olivia Maya PA-C    Patient location:  ICU  Performed by:  NP/PA  Indications:     Indications: difficulty obtaining IV access    Procedure details:     Location:  Right arm    Catheter size:  20 gauge    Number of attempts:  1    Successful placement: yes    Post-procedure details:     Assessment: free fluid flow and no signs of infiltration      Post-procedure complications: none      Patient tolerance of procedure:  Tolerated well, no immediate complications

## 2024-01-30 NOTE — QUICK NOTE
Received a call from Shelia Loza, patient's mother, requesting an update. I confirmed with Jaun that Shelia may receive updates on his medical status, and answer any questions she may have.     I provided a brief update. Shelia did share with me that she is concerned that Jaun does not follow a diet and does not take his insulin as prescribed. She states he is often in DKA and is admitted frequently at different hospitals, including [facility].     Shelia was appreciative of the update and has no further questions.

## 2024-01-30 NOTE — NURSING NOTE
Attempted to obtain patients 0400 labs. While rounding at 0400, observed R arm was swollen. R arm IVs difficult to flush. Medications running through R arm Ivs were put on hold. I attempted twice to obtain a new IV access to also obtain 0400 labs. Matthew AUGUSTIN attempted twice with ultrasound guidance to obtain new IV access and 0400 labs. Unable to obtain. CCAP Mona harmon with only insulin running until new IV access obtained. Insulin and D51/2NS not tested for compatibility. Continue with DKA protocol.

## 2024-01-30 NOTE — PROCEDURES
US Guided Peripheral IV    Date/Time: 1/30/2024 7:52 AM    Performed by: Olivia Maya PA-C  Authorized by: Olivia Maya PA-C    Patient location:  ICU  Performed by:  NP/PA  Indications:     Indications: difficulty obtaining IV access    Procedure details:     Patient evaluated for contraindications to access (i.e. fistula, thrombosis, etc): Yes      Standard clean technique used for ultrasound access: Yes      Location:  Left arm    Catheter size:  20 gauge    Number of attempts:  2    Successful placement: yes    Post-procedure details:     Post-procedure:  Dressing applied    Assessment: free fluid flow and no signs of infiltration      Post-procedure complications: none      Patient tolerance of procedure:  Tolerated well, no immediate complications

## 2024-01-30 NOTE — PROGRESS NOTES
[facility]  Progress Note  Name: Jaun Loza III I  MRN: [id number]  Unit/Bed#: -01 I Date of Admission: 1/29/2024   Date of Service: 1/30/2024 I Hospital Day: 1    Assessment/Plan   * DKA, type 1 (HCC)  Assessment & Plan  Lab Results   Component Value Date    HGBA1C 10.9 (H) 05/20/2022       Recent Labs     01/29/24  1643   POCGLU >500*         Blood Sugar Average: Last 72 hrs:    Hx of T1DM on SQ insulin regimen  Pt reports running out of insulin needles 2 days ago. He has since had progressive N/V/D and polydipsia  DKA POA AEB: pH 6.98, CO2 7, AG 38, , BHB 5.2  Initiate DKA protocol  IVF per protocol; transition to dextrose-containing IVF when BG < 250  DKA insulin gtt; transition to non-DKA insulin gtt when AG closed x 2, CO2>20 x2  Q1h BG  Q4h bmp, mag, and phos  NPO for now   Check Hgb A1C  Replete electrolytes PRN with goal K>4 and Mag>2  Will need to ensure refill of insulin needles prior to d/c    Lactic acidosis  Assessment & Plan  Lactic 7.8 on admission -- trend   In the setting of severe DKA -- do not suspect infectious etiology at this time  Continue IVF resuscitation per DKA protocol    SHREYAS (acute kidney injury) ([facility])  Assessment & Plan  Baseline Cr 0.9 in care everywhere  Cr on admission 1.94  UA bland  Likely prerenal 2/2 severe DKA, intravascular dehydration   Aggressive IVF resuscitation   Trend renal indices  Strict I/O     N&V (nausea and vomiting)  Assessment & Plan  Pt reports 2 days of N/V/D   Denies abdominal pain  Likely secondary to severe DKA  Zofran ordered PRN, may require Reglan  Check baseline EKG    Hyperphosphatemia  Assessment & Plan  Phos 10.4 on admission  Likely secondary to SHREYAS and severe hypovolemia  Trend q4h with IVF resuscitation    SIRS (systemic inflammatory response syndrome) ([facility])  Assessment & Plan  SIRS: tachycardia, tachypnea, leukocytosis  SIRS likely secondary to DKA, unlikely infection  Check UA  Monitor off  abx  Trend fever curve and WBC count    Hyponatremia  Assessment & Plan  Pseudohyponatremia in the setting of marked hyperglycemia  Corrected sodium 144    Hx of seizure disorder  Assessment & Plan  Pt reports seizures as a child but has not had one in many years. Not on AED outpatient  Seizure precautions              Disposition: Critical care    ICU Core Measures     A: Assess, Prevent, and Manage Pain Has pain been assessed? NA  Need for changes to pain regimen? NA   B: Both SAT/SAT  N/A   C: Choice of Sedation RASS Goal: N/A patient not on sedation  Need for changes to sedation or analgesia regimen? NA   D: Delirium CAM-ICU: Negative   E: Early Mobility  Plan for early mobility? Yes   F: Family Engagement Plan for family engagement today? Yes         Prophylaxis:  VTE Heparin SQ q8h   Stress Ulcer  not ordered         Significant 24hr Events     24hr events: AG and CO2 improving, glucose significantly improved. He slept overnight. Tolerated sips of water, though required PRN zofran and reglan x1 for intermittent nausea. He continues on DKA protocol.      Subjective   Review of Systems   Constitutional:  Positive for fatigue.   Respiratory: Negative.     Cardiovascular: Negative.    Gastrointestinal:  Positive for abdominal pain, nausea and vomiting. Negative for diarrhea.      Objective                            Vitals I/O      Most Recent Min/Max in 24hrs   Temp 98 °F (36.7 °C) Temp  Min: 97.1 °F (36.2 °C)  Max: 98.6 °F (37 °C)   Pulse (!) 107 Pulse  Min: 107  Max: 122   Resp 14 Resp  Min: 12  Max: 60   /69 BP  Min: 113/64  Max: 139/85   O2 Sat (!) 86 % (nasal cannula applied) SpO2  Min: 86 %  Max: 99 %      Intake/Output Summary (Last 24 hours) at 1/30/2024 0417  Last data filed at 1/30/2024 0200  Gross per 24 hour   Intake 2888.13 ml   Output 1125 ml   Net 1763.13 ml       Diet NPO    Invasive Monitoring           Physical Exam   Physical Exam  Vitals and nursing note reviewed.   Skin:     General:  Skin is warm.      Capillary Refill: Capillary refill takes less than 2 seconds.      Coloration: Skin is pale.   HENT:      Head: Normocephalic and atraumatic.      Mouth/Throat:      Lips: Pink.      Mouth: Mucous membranes are dry.   Cardiovascular:      Rate and Rhythm: Regular rhythm. Tachycardia present.   Musculoskeletal:      Right lower leg: No edema.      Left lower leg: No edema.   Abdominal: General: Abdomen is flat. Bowel sounds are decreased.      Palpations: Abdomen is soft.      Tenderness: There is generalized abdominal tenderness.   Constitutional:       General: He is awake. He is not in acute distress.     Appearance: He is well-developed. He is ill-appearing.   Pulmonary:      Effort: Tachypnea present.      Breath sounds: Normal breath sounds.   Psychiatric:         Mood and Affect: Affect is flat.         Behavior: Behavior is cooperative.   Neurological:      Mental Status: He is alert and oriented to person, place, and time.      GCS: GCS eye subscore is 4. GCS verbal subscore is 5. GCS motor subscore is 6.   Genitourinary/Anorectal:     Comments: Voiding independently            Diagnostic Studies      EKG: ST on tele   Imaging:   No orders to display       No radiologic imaging performed     Medications:  Scheduled PRN   chlorhexidine, 15 mL, Q12H MARTHA  heparin (porcine), 5,000 Units, Q8H MARTHA  potassium-sodium phosphateS, 2 tablet, Once      ondansetron, 4 mg, Q4H PRN  sodium chloride (PF), 3 mL, Q1H PRN       Continuous    dextrose 5 % and sodium chloride 0.45 %, 250 mL/hr, Last Rate: 250 mL/hr (01/30/24 0114)  insulin regular (HumuLIN R,NovoLIN R) 1 Units/mL in sodium chloride 0.9 % 100 mL infusion, 0.1-30 Units/hr, Last Rate: 5.9 Units/hr (01/29/24 2233)  lactated ringers, 250 mL/hr, Last Rate: Stopped (01/30/24 0114)         Labs:    CBC    Recent Labs     01/29/24  1637 01/29/24  1650   WBC  --  36.38*   HGB 17.7* 16.0   HCT 52* 50.5*   PLT  --  324     BMP    Recent Labs      01/29/24 1942 01/30/24  0026   SODIUM 138 138   K 4.8 5.3    106   CO2 10* 15*   AGAP 28 17   BUN 33* 27*   CREATININE 1.53* 1.46*   CALCIUM 9.4 9.4       Coags    No recent results     Additional Electrolytes  Recent Labs     01/29/24  1637 01/29/24  1650 01/29/24 1942 01/30/24  0026   MG  --    < > 2.1 2.3   PHOS  --    < > 4.7* 2.0*   CAIONIZED 1.12  --   --   --     < > = values in this interval not displayed.          Blood Gas    No recent results  Recent Labs     01/29/24  1650   PHVEN 6.982*   [id number] 30.1*   PO2VEN 49.0*   [id number] 7.0*   BEVEN -23.8   [id number] 74.8    LFTs  Recent Labs     01/29/24  1650   ALT 19   AST 19   ALKPHOS 90   ALB 4.8   TBILI 1.47*       Infectious  No recent results  Glucose  Recent Labs     01/29/24  1835 01/29/24 1942 01/29/24 2137 01/30/24  0026   GLUC 656* 541*  541* 525* 289*                 REBECA Jefferson

## 2024-01-31 LAB
ANION GAP SERPL CALCULATED.3IONS-SCNC: 11 MMOL/L
ANION GAP SERPL CALCULATED.3IONS-SCNC: 16 MMOL/L
ATRIAL RATE: 91 BPM
BUN SERPL-MCNC: 9 MG/DL (ref 5–25)
BUN SERPL-MCNC: 9 MG/DL (ref 5–25)
CALCIUM SERPL-MCNC: 7.9 MG/DL (ref 8.4–10.2)
CALCIUM SERPL-MCNC: 8.5 MG/DL (ref 8.4–10.2)
CHLORIDE SERPL-SCNC: 103 MMOL/L (ref 96–108)
CHLORIDE SERPL-SCNC: 99 MMOL/L (ref 96–108)
CO2 SERPL-SCNC: 22 MMOL/L (ref 21–32)
CO2 SERPL-SCNC: 24 MMOL/L (ref 21–32)
CREAT SERPL-MCNC: 0.75 MG/DL (ref 0.6–1.3)
CREAT SERPL-MCNC: 0.79 MG/DL (ref 0.6–1.3)
ERYTHROCYTE [DISTWIDTH] IN BLOOD BY AUTOMATED COUNT: 12.2 % (ref 11.6–15.1)
GFR SERPL CREATININE-BSD FRML MDRD: 120 ML/MIN/1.73SQ M
GFR SERPL CREATININE-BSD FRML MDRD: 123 ML/MIN/1.73SQ M
GLUCOSE SERPL-MCNC: 127 MG/DL (ref 65–140)
GLUCOSE SERPL-MCNC: 142 MG/DL (ref 65–140)
GLUCOSE SERPL-MCNC: 160 MG/DL (ref 65–140)
GLUCOSE SERPL-MCNC: 162 MG/DL (ref 65–140)
GLUCOSE SERPL-MCNC: 163 MG/DL (ref 65–140)
GLUCOSE SERPL-MCNC: 165 MG/DL (ref 65–140)
GLUCOSE SERPL-MCNC: 169 MG/DL (ref 65–140)
GLUCOSE SERPL-MCNC: 175 MG/DL (ref 65–140)
GLUCOSE SERPL-MCNC: 181 MG/DL (ref 65–140)
GLUCOSE SERPL-MCNC: 185 MG/DL (ref 65–140)
GLUCOSE SERPL-MCNC: 192 MG/DL (ref 65–140)
GLUCOSE SERPL-MCNC: 230 MG/DL (ref 65–140)
GLUCOSE SERPL-MCNC: 64 MG/DL (ref 65–140)
HCT VFR BLD AUTO: 35.8 % (ref 36.5–49.3)
HGB BLD-MCNC: 11.8 G/DL (ref 12–17)
MAGNESIUM SERPL-MCNC: 2.2 MG/DL (ref 1.9–2.7)
MCH RBC QN AUTO: 27.3 PG (ref 26.8–34.3)
MCHC RBC AUTO-ENTMCNC: 33 G/DL (ref 31.4–37.4)
MCV RBC AUTO: 83 FL (ref 82–98)
P AXIS: 64 DEGREES
PHOSPHATE SERPL-MCNC: 2.5 MG/DL (ref 2.7–4.5)
PLATELET # BLD AUTO: 189 THOUSANDS/UL (ref 149–390)
PMV BLD AUTO: 11.4 FL (ref 8.9–12.7)
POTASSIUM SERPL-SCNC: 3.4 MMOL/L (ref 3.5–5.3)
POTASSIUM SERPL-SCNC: 3.6 MMOL/L (ref 3.5–5.3)
PR INTERVAL: 156 MS
PROCALCITONIN SERPL-MCNC: 0.88 NG/ML
QRS AXIS: 125 DEGREES
QRSD INTERVAL: 102 MS
QT INTERVAL: 384 MS
QTC INTERVAL: 472 MS
RBC # BLD AUTO: 4.32 MILLION/UL (ref 3.88–5.62)
SODIUM SERPL-SCNC: 137 MMOL/L (ref 135–147)
SODIUM SERPL-SCNC: 138 MMOL/L (ref 135–147)
T WAVE AXIS: 22 DEGREES
VENTRICULAR RATE: 91 BPM
WBC # BLD AUTO: 18.68 THOUSAND/UL (ref 4.31–10.16)

## 2024-01-31 PROCEDURE — 94760 N-INVAS EAR/PLS OXIMETRY 1: CPT

## 2024-01-31 PROCEDURE — 82948 REAGENT STRIP/BLOOD GLUCOSE: CPT

## 2024-01-31 PROCEDURE — 84100 ASSAY OF PHOSPHORUS: CPT | Performed by: PHYSICIAN ASSISTANT

## 2024-01-31 PROCEDURE — 84145 PROCALCITONIN (PCT): CPT | Performed by: PHYSICIAN ASSISTANT

## 2024-01-31 PROCEDURE — 99222 1ST HOSP IP/OBS MODERATE 55: CPT | Performed by: STUDENT IN AN ORGANIZED HEALTH CARE EDUCATION/TRAINING PROGRAM

## 2024-01-31 PROCEDURE — 93005 ELECTROCARDIOGRAM TRACING: CPT

## 2024-01-31 PROCEDURE — 80048 BASIC METABOLIC PNL TOTAL CA: CPT | Performed by: INTERNAL MEDICINE

## 2024-01-31 PROCEDURE — 83735 ASSAY OF MAGNESIUM: CPT | Performed by: PHYSICIAN ASSISTANT

## 2024-01-31 PROCEDURE — 80048 BASIC METABOLIC PNL TOTAL CA: CPT | Performed by: PHYSICIAN ASSISTANT

## 2024-01-31 PROCEDURE — 99232 SBSQ HOSP IP/OBS MODERATE 35: CPT | Performed by: INTERNAL MEDICINE

## 2024-01-31 PROCEDURE — 85027 COMPLETE CBC AUTOMATED: CPT | Performed by: PHYSICIAN ASSISTANT

## 2024-01-31 RX ORDER — INSULIN LISPRO 100 [IU]/ML
3 INJECTION, SOLUTION INTRAVENOUS; SUBCUTANEOUS
Status: DISCONTINUED | OUTPATIENT
Start: 2024-01-31 | End: 2024-01-31

## 2024-01-31 RX ORDER — INSULIN GLARGINE 100 [IU]/ML
15 INJECTION, SOLUTION SUBCUTANEOUS
Status: DISCONTINUED | OUTPATIENT
Start: 2024-01-31 | End: 2024-02-01

## 2024-01-31 RX ORDER — METOCLOPRAMIDE HYDROCHLORIDE 5 MG/ML
10 INJECTION INTRAMUSCULAR; INTRAVENOUS EVERY 6 HOURS PRN
Status: DISCONTINUED | OUTPATIENT
Start: 2024-01-31 | End: 2024-02-02 | Stop reason: HOSPADM

## 2024-01-31 RX ORDER — INSULIN LISPRO 100 [IU]/ML
2 INJECTION, SOLUTION INTRAVENOUS; SUBCUTANEOUS
Status: DISCONTINUED | OUTPATIENT
Start: 2024-01-31 | End: 2024-02-01

## 2024-01-31 RX ORDER — POTASSIUM CHLORIDE 14.9 MG/ML
20 INJECTION INTRAVENOUS ONCE
Status: COMPLETED | OUTPATIENT
Start: 2024-01-31 | End: 2024-01-31

## 2024-01-31 RX ORDER — INSULIN LISPRO 100 [IU]/ML
1-5 INJECTION, SOLUTION INTRAVENOUS; SUBCUTANEOUS
Status: DISCONTINUED | OUTPATIENT
Start: 2024-01-31 | End: 2024-02-01

## 2024-01-31 RX ADMIN — METOCLOPRAMIDE 10 MG: 5 INJECTION, SOLUTION INTRAMUSCULAR; INTRAVENOUS at 10:11

## 2024-01-31 RX ADMIN — SODIUM CHLORIDE, SODIUM GLUCONATE, SODIUM ACETATE, POTASSIUM CHLORIDE, MAGNESIUM CHLORIDE, SODIUM PHOSPHATE, DIBASIC, AND POTASSIUM PHOSPHATE 100 ML/HR: .53; .5; .37; .037; .03; .012; .00082 INJECTION, SOLUTION INTRAVENOUS at 19:29

## 2024-01-31 RX ADMIN — CHLORHEXIDINE GLUCONATE 15 ML: 1.2 SOLUTION ORAL at 08:10

## 2024-01-31 RX ADMIN — TRIMETHOBENZAMIDE HYDROCHLORIDE 200 MG: 100 INJECTION INTRAMUSCULAR at 05:07

## 2024-01-31 RX ADMIN — HEPARIN SODIUM 5000 UNITS: 5000 INJECTION INTRAVENOUS; SUBCUTANEOUS at 22:46

## 2024-01-31 RX ADMIN — METOCLOPRAMIDE 10 MG: 5 INJECTION, SOLUTION INTRAMUSCULAR; INTRAVENOUS at 23:48

## 2024-01-31 RX ADMIN — HEPARIN SODIUM 5000 UNITS: 5000 INJECTION INTRAVENOUS; SUBCUTANEOUS at 14:30

## 2024-01-31 RX ADMIN — POTASSIUM PHOSPHATE, MONOBASIC POTASSIUM PHOSPHATE, DIBASIC 12 MMOL: 224; 236 INJECTION, SOLUTION, CONCENTRATE INTRAVENOUS at 12:32

## 2024-01-31 RX ADMIN — HEPARIN SODIUM 5000 UNITS: 5000 INJECTION INTRAVENOUS; SUBCUTANEOUS at 05:04

## 2024-01-31 RX ADMIN — INSULIN GLARGINE 15 UNITS: 100 INJECTION, SOLUTION SUBCUTANEOUS at 14:30

## 2024-01-31 RX ADMIN — POTASSIUM CHLORIDE 20 MEQ: 14.9 INJECTION, SOLUTION INTRAVENOUS at 10:15

## 2024-01-31 RX ADMIN — TRIMETHOBENZAMIDE HYDROCHLORIDE 200 MG: 100 INJECTION INTRAMUSCULAR at 12:32

## 2024-01-31 NOTE — ASSESSMENT & PLAN NOTE
Phos 10.4 on admission  Likely secondary to SHREYAS and severe hypovolemia  Will order potassium phosphate IV

## 2024-01-31 NOTE — NURSING NOTE
Attempted to obtain patients 0400 labs. While rounding at 0400, observed R arm was swollen. R arms Ivs difficult to flush. Medications running through R arm Ivs put on hold. I attempted twice to obtain new IV access and 0400 labs. Matthew AUGUSTIN attempted twice with ultrasound guidance. Unable to obtain. Patient only has 1 IV access. D51/2NS and insulin not compatible. Verbalized to next shift inability to obtain additional IV access and 0400 labs. Two R arm IVs removed.

## 2024-01-31 NOTE — PLAN OF CARE
Problem: Potential for Falls  Goal: Patient will remain free of falls  Description: INTERVENTIONS:  - Educate patient/family on patient safety including physical limitations  - Instruct patient to call for assistance with activity   - Consult OT/PT to assist with strengthening/mobility   - Keep Call bell within reach  - Keep bed low and locked with side rails adjusted as appropriate  - Keep care items and personal belongings within reach  - Initiate and maintain comfort rounds  - Make Fall Risk Sign visible to staff  - Apply yellow socks and bracelet for high fall risk patients  - Consider moving patient to room near nurses station  Outcome: Progressing     Problem: PAIN - ADULT  Goal: Verbalizes/displays adequate comfort level or baseline comfort level  Description: Interventions:  - Encourage patient to monitor pain and request assistance  - Assess pain using appropriate pain scale  - Administer analgesics based on type and severity of pain and evaluate response  - Implement non-pharmacological measures as appropriate and evaluate response  - Consider cultural and social influences on pain and pain management  - Notify physician/advanced practitioner if interventions unsuccessful or patient reports new pain  Outcome: Progressing     Problem: INFECTION - ADULT  Goal: Absence or prevention of progression during hospitalization  Description: INTERVENTIONS:  - Assess and monitor for signs and symptoms of infection  - Monitor lab/diagnostic results  - Monitor all insertion sites, i.e. indwelling lines, tubes, and drains  - Monitor endotracheal if appropriate and nasal secretions for changes in amount and color  - [place] appropriate cooling/warming therapies per order  - Administer medications as ordered  - Instruct and encourage patient and family to use good hand hygiene technique  - Identify and instruct in appropriate isolation precautions for identified infection/condition  Outcome: Progressing  Goal: Absence  of fever/infection during neutropenic period  Description: INTERVENTIONS:  - Monitor WBC    Outcome: Progressing     Problem: SAFETY ADULT  Goal: Patient will remain free of falls  Description: INTERVENTIONS:  - Educate patient/family on patient safety including physical limitations  - Instruct patient to call for assistance with activity   - Consult OT/PT to assist with strengthening/mobility   - Keep Call bell within reach  - Keep bed low and locked with side rails adjusted as appropriate  - Keep care items and personal belongings within reach  - Initiate and maintain comfort rounds  - Make Fall Risk Sign visible to staff  - Apply yellow socks and bracelet for high fall risk patients  - Consider moving patient to room near nurses station  Outcome: Progressing  Goal: Maintain or return to baseline ADL function  Description: INTERVENTIONS:  -  Assess patient's ability to carry out ADLs; assess patient's baseline for ADL function and identify physical deficits which impact ability to perform ADLs (bathing, care of mouth/teeth, toileting, grooming, dressing, etc.)  - Assess/evaluate cause of self-care deficits   - Assess range of motion  - Assess patient's mobility; develop plan if impaired  - Assess patient's need for assistive devices and provide as appropriate  - Encourage maximum independence but intervene and supervise when necessary  - Involve family in performance of ADLs  - Assess for home care needs following discharge   - Consider OT consult to assist with ADL evaluation and planning for discharge  - Provide patient education as appropriate  Outcome: Progressing  Goal: Maintains/Returns to pre admission functional level  Description: INTERVENTIONS:  - Perform AM-PAC 6 Click Basic Mobility/ Daily Activity assessment daily.  - Set and communicate daily mobility goal to care team and patient/family/caregiver.   - Collaborate with rehabilitation services on mobility goals if consulted  - Out of bed for  toileting  - Record patient progress and toleration of activity level   Outcome: Progressing     Problem: DISCHARGE PLANNING  Goal: Discharge to home or other facility with appropriate resources  Description: INTERVENTIONS:  - Identify barriers to discharge w/patient and caregiver  - Arrange for needed discharge resources and transportation as appropriate  - Identify discharge learning needs (meds, wound care, etc.)  - Arrange for interpretive services to assist at discharge as needed  - Refer to Case Management Department for coordinating discharge planning if the patient needs post-hospital services based on physician/advanced practitioner order or complex needs related to functional status, cognitive ability, or social support system  Outcome: Progressing     Problem: Knowledge Deficit  Goal: Patient/family/caregiver demonstrates understanding of disease process, treatment plan, medications, and discharge instructions  Description: Complete learning assessment and assess knowledge base.  Interventions:  - Provide teaching at level of understanding  - Provide teaching via preferred learning methods  Outcome: Progressing     Problem: Nutrition/Hydration-ADULT  Goal: Nutrient/Hydration intake appropriate for improving, restoring or maintaining nutritional needs  Description: Monitor and assess patient's nutrition/hydration status for malnutrition. Collaborate with interdisciplinary team and initiate plan and interventions as ordered.  Monitor patient's weight and dietary intake as ordered or per policy. Utilize nutrition screening tool and intervene as necessary. Determine patient's food preferences and provide high-protein, high-caloric foods as appropriate.     INTERVENTIONS:  - Monitor oral intake, urinary output, labs, and treatment plans  - Assess nutrition and hydration status and recommend course of action  - Evaluate amount of meals eaten  - Assist patient with eating if necessary   - Allow adequate time for  meals  - Recommend/ encourage appropriate diets, oral nutritional supplements, and vitamin/mineral supplements  - Order, calculate, and assess calorie counts as needed  - Recommend, monitor, and adjust tube feedings and TPN/PPN based on assessed needs  - Assess need for intravenous fluids  - Provide specific nutrition/hydration education as appropriate  - Include patient/family/caregiver in decisions related to nutrition  Outcome: Progressing

## 2024-01-31 NOTE — ASSESSMENT & PLAN NOTE
Baseline Cr 0.9 in care everywhere  Cr on admission 1.94  UA bland  Likely prerenal 2/2 severe DKA, intravascular dehydration   Aggressive IVF resuscitation   Creatinine is 0.79  Resolved

## 2024-01-31 NOTE — NURSING NOTE
Patient complaining of nausea. Zofran PRN given. Waiting for nausea to subside to give PO potassium pills.

## 2024-01-31 NOTE — PROGRESS NOTES
[facility]  Progress Note  Name: Jaun Loza III I  MRN: [id number]  Unit/Bed#: -01 I Date of Admission: 1/29/2024   Date of Service: 1/31/2024 I Hospital Day: 2    Assessment/Plan   Hyperphosphatemia  Assessment & Plan  Phos 10.4 on admission  Likely secondary to SHREYAS and severe hypovolemia  Will order potassium phosphate IV       SHREYAS (acute kidney injury) ([facility])  Assessment & Plan  Baseline Cr 0.9 in care everywhere  Cr on admission 1.94  UA bland  Likely prerenal 2/2 severe DKA, intravascular dehydration   Aggressive IVF resuscitation   Creatinine is 0.79  Resolved    Lactic acidosis  Assessment & Plan  Lactic 7.8 on admission -- trend   In the setting of severe DKA -- do not suspect infectious etiology at this time  Continue IVF resuscitation per DKA protocol    SIRS (systemic inflammatory response syndrome) ([facility])  Assessment & Plan  SIRS: tachycardia, tachypnea, leukocytosis  SIRS likely secondary to DKA, unlikely infection  UA is negative.  Chest x-ray is unremarkable  Monitor off abx  Leukocytosis is improving  Trend fever curve and WBC count    Hyponatremia  Assessment & Plan  Pseudohyponatremia in the setting of marked hyperglycemia  Resolved    N&V (nausea and vomiting)  Assessment & Plan  Pt reports 2 days of N/V/D   Denies abdominal pain  Likely secondary to severe DKA  Start on Reglan  Antiemetics as needed    Hx of seizure disorder  Assessment & Plan  Pt reports seizures as a child but has not had one in many years. Not on AED outpatient  Seizure precautions    * DKA, type 1 ([facility])  Assessment & Plan  Lab Results   Component Value Date    HGBA1C 9.0 (H) 01/29/2024       Recent Labs     01/31/24  0121 01/31/24  0334 01/31/24  0503 01/31/24  0706   POCGLU 185* 142* 160* 165*       Blood Sugar Average: Last 72 hrs:  (P) [id number]  Hx of T1DM on SQ insulin regimen  Pt reports running out of insulin needles 2 days ago. He has since had progressive N/V/D  and polydipsia  DKA POA AEB: pH 6.98, CO2 7, AG 38, , BHB 5.2  Initiate DKA protocol  IVF per protocol; transition to dextrose-containing IVF when BG < 250  DKA insulin gtt; transition to non-DKA insulin gtt when AG closed x 2, CO2>20 x2  Q1h BG  Endocrinology consult  Clear liquid diet.  Will advance as tolerated  Trend BMP  Replete electrolytes PRN with goal K>4 and Mag>2  Will need to ensure refill of insulin needles prior to d/c             Labs & Imaging: I have personally reviewed pertinent reports.      VTE Prophylaxis: in place.    Code Status:   Level 1 - Full Code    Patient Centered Rounds: I have performed bedside rounds with nursing staff today.    Mobility:   Basic Mobility Inpatient Raw Score: 24  JH-HLM Goal: 8: Walk 250 feet or more  JH-HLM Achieved: 7: Walk 25 feet or more  HLM Goal achieved. Continue to encourage appropriate mobility.    Discussions with Specialists or Other Care Team Provider: CM    Education and Discussions with Family / Patient: Patient states he will update his family.  I offered to call    Total Time Spent on Date of Encounter in care of patient: 35 mins. This time was spent on one or more of the following: performing physical exam; counseling and coordination of care; obtaining or reviewing history; documenting in the medical record; reviewing/ordering tests, medications or procedures; communicating with other healthcare professionals and discussing with patient's family/caregivers.    Current Length of Stay: 2 day(s)    Current Patient Status: Inpatient   Certification Statement: The patient will continue to require additional inpatient hospital stay due to see my assessment and plan.     Subjective:   Patient is seen and examined at bedside.  Complains of nausea and vomiting.  No other complaints.  Afebrile  All other ROS are negative.    Objective:    Vitals: Blood pressure 130/80, pulse 93, temperature 98.2 °F (36.8 °C), temperature source Oral, resp. rate 20,  "height 5' 6\" (1.676 m), weight 62.5 kg (137 lb 12.6 oz), SpO2 96%.,Body mass index is 22.24 kg/m².  SPO2 RA Rest      Flowsheet Row ED to Hosp-Admission (Current) from 1/29/2024 in [facility] Intensive Care Unit   SpO2 96 %   SpO2 Activity At Rest   O2 Device None (Room air)   O2 Flow Rate --          I&O:   Intake/Output Summary (Last 24 hours) at 1/31/2024 0916  Last data filed at 1/31/2024 0813  Gross per 24 hour   Intake 3897.7 ml   Output 1325 ml   Net 2572.7 ml       Physical Exam:    General- Alert, sitting comfortably in chair. Not in any acute distress.  Neck- Supple, No JVD  CVS- regular, S1 and S2 normal  Chest- Bilateral Air entry, No rhochi, crackles or wheezing present.  Abdomen- soft, nontender, not distended, no guarding or rigidity, BS+  Extremities-  No pedal edema, No calf tenderness  CNS-   Alert, awake and orientedx3. No focal deficits present.    Invasive Devices       Peripheral Intravenous Line  Duration             Peripheral IV 01/29/24 Left;Upper;Ventral (anterior) Arm 1 day    Peripheral IV 01/30/24 Right;Ventral (anterior) Forearm 1 day                          Social History  reviewed  History reviewed. No pertinent family history. reviewed    Meds:  Current Facility-Administered Medications   Medication Dose Route Frequency Provider Last Rate Last Admin    acetaminophen (TYLENOL) tablet 650 mg  650 mg Oral Q6H PRN Olivia Maya PA-C   650 mg at 01/30/24 0747    chlorhexidine (PERIDEX) 0.12 % oral rinse 15 mL  15 mL Mouth/Throat Q12H [facility] Olivia Maya PA-C   15 mL at 01/31/24 0810    heparin (porcine) subcutaneous injection 5,000 Units  5,000 Units Subcutaneous Q8H [facility] REBECA Tillman   5,000 Units at 01/31/24 0504    insulin regular (HumuLIN R,NovoLIN R) 1 Units/mL in sodium chloride 0.9 % 100 mL infusion  0.3-21 Units/hr Intravenous Titrated Olivia Maya PA-C 2 mL/hr at 01/31/24 0800 2 Units/hr at 01/31/24 0800    metoclopramide (REGLAN) injection 10 mg "  10 mg Intravenous Q6H PRN Armando Salinas MD        multi-electrolyte (PLASMALYTE-A/ISOLYTE-S PH 7.4) IV solution  100 mL/hr Intravenous Continuous Olivia Maya PA-C 100 mL/hr at 01/30/24 2325 100 mL/hr at 01/30/24 2325    potassium chloride 20 mEq IVPB (premix)  20 mEq Intravenous Once Armando Salinas MD        potassium phosphates 12 mmol in sodium chloride 0.9 % 250 mL infusion  12 mmol Intravenous Once Armando Salinas MD        potassium-sodium phosphateS (K-PHOS,PHOSPHA 250) -250 mg tablet 2 tablet  2 tablet Oral Once REBECA Tillman        sodium chloride (PF) 0.9 % injection 3 mL  3 mL Intravenous Q1H PRN Aaron De Jesus DO        trimethobenzamide (TIGAN) IM injection 200 mg  200 mg Intramuscular Q6H PRN REBECA Tillman   200 mg at 01/31/24 0507      Medications Prior to Admission   Medication    insulin NPH-insulin regular (NovoLIN 70/30) 100 units/mL subcutaneous injection       Labs:  Results from last 7 days   Lab Units 01/31/24  0455 01/30/24  0747 01/29/24  1650   WBC Thousand/uL 18.68* 28.81* 36.38*   HEMOGLOBIN g/dL 11.8* 14.7 16.0   HEMATOCRIT % 35.8* 42.5 50.5*   PLATELETS Thousands/uL 189 274 324   NEUTROS PCT %  --  84*  --    LYMPHS PCT %  --  8*  --    MONOS PCT %  --  7  --    EOS PCT %  --  0  --      Results from last 7 days   Lab Units 01/31/24  0455 01/30/24  1705 01/30/24  1227 01/29/24  1942 01/29/24  1650 01/29/24  1637   POTASSIUM mmol/L 3.4* 3.5 4.3   < > 5.0  5.0  5.0  --    CHLORIDE mmol/L 103 108 109*   < > 88*  88*  88*  --    CO2 mmol/L 24 24 20*   < > 8*  7*  7*  --    CO2, I-STAT mmol/L  --   --   --   --   --  8*   BUN mg/dL 9 16 21   < > 35*  36*  36*  --    CREATININE mg/dL 0.79 0.93 1.11   < > 1.94*  1.94*  1.94*  --    CALCIUM mg/dL 7.9* 8.3* 8.5   < > 10.1  10.1  10.1  --    ALK PHOS U/L  --   --   --   --  90  --    ALT U/L  --   --   --   --  19  --    AST U/L  --   --   --   --  19  --    GLUCOSE, ISTAT mg/dl  --   --   --   --   " --  >600*    < > = values in this interval not displayed.     No results found for: \"TROPONINI\", \"CKMB\", \"CKTOTAL\"      No results found for: \"BLOODCX\", \"URINECX\", \"WOUNDCULT\", \"SPUTUMCULTUR\"      Imaging:  No results found for this or any previous visit.    No results found for this or any previous visit.      Last 24 Hours Medication List:   Current Facility-Administered Medications   Medication Dose Route Frequency Provider Last Rate    acetaminophen  650 mg Oral Q6H PRN Olivia Maya PA-C      chlorhexidine  15 mL Mouth/Throat Q12H [facility] MERVAT Yeh-TREVER      heparin (porcine)  5,000 Units Subcutaneous Q8H [facility] REBECA Tillman      insulin regular (HumuLIN R,NovoLIN R) 1 Units/mL in sodium chloride 0.9 % 100 mL infusion  0.3-21 Units/hr Intravenous Titrated Olivia Maya PA-C 2 Units/hr (01/31/24 0800)    metoclopramide  10 mg Intravenous Q6H PRN Armando Salinas MD      multi-electrolyte  100 mL/hr Intravenous Continuous Olivia Maya PA-C 100 mL/hr (01/30/24 2325)    potassium chloride  20 mEq Intravenous Once Armando Salinas MD      potassium phosphate  12 mmol Intravenous Once Armando Salinas MD      potassium-sodium phosphateS  2 tablet Oral Once REBECA Tillman      sodium chloride (PF)  3 mL Intravenous Q1H PRN Aaron De Jesus DO      trimethobenzamide  200 mg Intramuscular Q6H PRN REBECA Tillman          Today, Patient Was Seen By: Armando Salinas MD    ** Please Note: Dictation voice to text software may have been used in the creation of this document. **          "

## 2024-01-31 NOTE — ASSESSMENT & PLAN NOTE
SIRS: tachycardia, tachypnea, leukocytosis  SIRS likely secondary to DKA, unlikely infection  UA is negative.  Chest x-ray is unremarkable  Monitor off abx  Leukocytosis is improving  Trend fever curve and WBC count

## 2024-01-31 NOTE — ASSESSMENT & PLAN NOTE
Lab Results   Component Value Date    HGBA1C 9.0 (H) 01/29/2024       Recent Labs     01/31/24  0121 01/31/24  0334 01/31/24  0503 01/31/24  0706   POCGLU 185* 142* 160* 165*       Blood Sugar Average: Last 72 hrs:  (P) [id number]  Hx of T1DM on SQ insulin regimen  Pt reports running out of insulin needles 2 days ago. He has since had progressive N/V/D and polydipsia  DKA POA AEB: pH 6.98, CO2 7, AG 38, , BHB 5.2  Initiate DKA protocol  IVF per protocol; transition to dextrose-containing IVF when BG < 250  DKA insulin gtt; transition to non-DKA insulin gtt when AG closed x 2, CO2>20 x2  Q1h BG  Endocrinology consult  Clear liquid diet.  Will advance as tolerated  Trend BMP  Replete electrolytes PRN with goal K>4 and Mag>2  Will need to ensure refill of insulin needles prior to d/c

## 2024-01-31 NOTE — ASSESSMENT & PLAN NOTE
Pt reports 2 days of N/V/D   Denies abdominal pain  Likely secondary to severe DKA  Start on Reglan  Antiemetics as needed

## 2024-01-31 NOTE — CONSULTS
Consultation - Jaun Loza III 28 y.o. male MRN: [id number]    Unit/Bed#: -01 Encounter: [id number]      Assessment/Plan     Assessment:  This is a 28 y.o.-year-old male witht type 1  diabetes with hyperglycemia admitted with DKA d.t insulin non compliance. DKA resolved    HbA1c 9.2%  Home regimen:   Inpatient regimen: Insulin Drip      Plan:  - Start lantus 15u for now  - Start Lispro 2u with meals- can adjust more once on regular diet  - d/c insulin drip 2hrs after    Discussed importance of regular OP endocrine care in t1DM patients. Please prescribe Lantus/lispro on discharge with pen needles and glucometer and testing strips. Dose insulin TBD. Place outpatient endocrine referral if patient wishes to follow up with Endocrine for diabetes management      For discharge, if Lantus solostar is not the preferred basal insulin for the patient's insurance company, please substitute with Tresiba flexpen, Basaglar Kwikpen , Levemir flexpen, Toujeo solostar pen  or equivalent insulin vials at the same dose instead.      For discharge, if Humalog  Kwik pen is not the preferred mealtime insulin for the patient's insurance, please substitute with NovoLog flexpen, Fiasp  flextouch, Admelog solostar  or Apidra solostar pen or equivalent insulin vials at the same dose instead.      Please prescribe insulin pen needles, glucometer, test strips, lancets and insulin syringes (only when using insulin vials) on discharge.      Inpatient consult to Endocrinology  Consult performed by: Terri Melendrez MD  Consult ordered by: Olivia aMya PA-C          CC: T1DM with DKA    History of Present Illness     HPI: Jaun Loza III is a 28 y.o. year old male with type 1DM on MDI with inconsistency in care who hasn't followed up a doctor for a while and reportedly got insulin [employer] over the counter? who presented to ED in DKA in setting of being out of his insulin for 2 days d/t not having needles. On admission BG  803, Beta 5,2, AG 38 and CO2 7 indicating severe DKA with symptoms of N/V.     Home regime- NPH 10u BID and Insulin R based on BG- unable to give me details  Current regime- insulin drip   BG  at home- reports has dexcom, when asked how he got it, reports his old doc gave him lots of samples so he has left over  HbA1C poorly controlled with last A1C 01.24 9.2%    Patient currently reports feels slightly better but still little nauseated. DKA has resolved, on clear liquid diet for now    Review of Systems   Constitutional:  Negative for diaphoresis, fatigue and unexpected weight change.   Respiratory:  Negative for shortness of breath.    Cardiovascular:  Negative for chest pain and palpitations.   Gastrointestinal:  Negative for constipation and diarrhea.   Endocrine: Negative for polydipsia and polyuria.       Historical Information   Past Medical History:   Diagnosis Date    Type 1 diabetes mellitus (HCC)      History reviewed. No pertinent surgical history.  Social History   Social History     Substance and Sexual Activity   Alcohol Use None     Social History     Substance and Sexual Activity   Drug Use Not on file     Social History     Tobacco Use   Smoking Status Not on file   Smokeless Tobacco Not on file     Family History: History reviewed. No pertinent family history.    Meds/Allergies   Current Facility-Administered Medications   Medication Dose Route Frequency Provider Last Rate Last Admin    acetaminophen (TYLENOL) tablet 650 mg  650 mg Oral Q6H PRN Olivia Maya PA-C   650 mg at 01/30/24 0747    chlorhexidine (PERIDEX) 0.12 % oral rinse 15 mL  15 mL Mouth/Throat Q12H [facility] Olivia Maya PA-C   15 mL at 01/31/24 0810    heparin (porcine) subcutaneous injection 5,000 Units  5,000 Units Subcutaneous Q8H [facility] REBECA Tillman   5,000 Units at 01/31/24 0504    insulin regular (HumuLIN R,NovoLIN R) 1 Units/mL in sodium chloride 0.9 % 100 mL infusion  0.3-21 Units/hr Intravenous Titrated Olivia Romero  "ERIN Maya 5 mL/hr at 01/31/24 1010 5 Units/hr at 01/31/24 1010    metoclopramide (REGLAN) injection 10 mg  10 mg Intravenous Q6H PRN Armando Salinas MD   10 mg at 01/31/24 1011    multi-electrolyte (PLASMALYTE-A/ISOLYTE-S PH 7.4) IV solution  100 mL/hr Intravenous Continuous Olivia Romero ERIN Maya 100 mL/hr at 01/30/24 2325 100 mL/hr at 01/30/24 2325    potassium chloride 20 mEq IVPB (premix)  20 mEq Intravenous Once Armando Salinas MD 50 mL/hr at 01/31/24 1015 20 mEq at 01/31/24 1015    potassium phosphates 12 mmol in sodium chloride 0.9 % 250 mL infusion  12 mmol Intravenous Once Armando Salinas MD        potassium-sodium phosphateS (K-PHOS,PHOSPHA 250) -250 mg tablet 2 tablet  2 tablet Oral Once REBECA Tillman        sodium chloride (PF) 0.9 % injection 3 mL  3 mL Intravenous Q1H PRN Aaron De Jesus DO        trimethobenzamide (TIGAN) IM injection 200 mg  200 mg Intramuscular Q6H PRN REBECA Tillman   200 mg at 01/31/24 0507     No Known Allergies    Objective   Vitals: Blood pressure 142/95, pulse (!) 106, temperature 98 °F (36.7 °C), temperature source Oral, resp. rate (!) 28, height 5' 6\" (1.676 m), weight 62.5 kg (137 lb 12.6 oz), SpO2 100%.    Intake/Output Summary (Last 24 hours) at 1/31/2024 1140  Last data filed at 1/31/2024 0813  Gross per 24 hour   Intake 3761.66 ml   Output 1325 ml   Net 2436.66 ml     Invasive Devices       Peripheral Intravenous Line  Duration             Peripheral IV 01/30/24 Right;Ventral (anterior) Forearm 1 day                    Physical Exam  Constitutional:       Appearance: Normal appearance. He is normal weight.   Cardiovascular:      Rate and Rhythm: Normal rate and regular rhythm.      Pulses: Normal pulses.   Pulmonary:      Effort: Pulmonary effort is normal.   Abdominal:      General: Abdomen is flat.      Palpations: Abdomen is soft.   Skin:     General: Skin is warm.      Capillary Refill: Capillary refill takes less than 2 seconds. "   Neurological:      General: No focal deficit present.      Mental Status: He is alert.         The history was obtained from the review of the chart, patient.    Lab Results:    Latest Reference Range & Units 01/07/22 13:55 05/20/22 19:57 01/29/24 16:50   Hemoglobin A1C Normal 4.0-5.6%; PreDiabetic 5.7-6.4%; Diabetic >=6.5%; Glycemic control for adults with diabetes <7.0% % 10.7 (H) (E) 10.9 (H) (E) 9.0 (H)   (H): Data is abnormally high  (E): External lab result     Latest Reference Range & Units 01/29/24 16:50   BETA-HYDROXYBUTYRATE <0.6 mmol/L 5.2 (H)   (H): Data is abnormally high     Latest Reference Range & Units 01/29/24 16:50   Sodium 135 - 147 mmol/L  135 - 147 mmol/L  135 - 147 mmol/L 134 (L)  133 (L)  133 (L)   Potassium 3.5 - 5.3 mmol/L  3.5 - 5.3 mmol/L  3.5 - 5.3 mmol/L 5.0  5.0  5.0   Chloride 96 - 108 mmol/L  96 - 108 mmol/L  96 - 108 mmol/L 88 (L)  88 (L)  88 (L)   Carbon Dioxide 21 - 32 mmol/L  21 - 32 mmol/L  21 - 32 mmol/L 8 (LL)  7 (LL)  7 (LL)   ANION GAP mmol/L  mmol/L  mmol/L 38  38  38   BUN 5 - 25 mg/dL  5 - 25 mg/dL  5 - 25 mg/dL 35 (H)  36 (H)  36 (H)   Creatinine 0.60 - 1.30 mg/dL  0.60 - 1.30 mg/dL  0.60 - 1.30 mg/dL 1.94 (H)  1.94 (H)  1.94 (H)   GLUCOSE 65 - 140 mg/dL  65 - 140 mg/dL  65 - 140 mg/dL 802 (HH)  803 (HH)  803 (HH)   (LL): Data is critically low  (HH): Data is critically high  (L): Data is abnormally low  (H): Data is abnormally high     Latest Reference Range & Units 01/31/24 04:55   Sodium 135 - 147 mmol/L 138   Potassium 3.5 - 5.3 mmol/L 3.4 (L)   Chloride 96 - 108 mmol/L 103   Carbon Dioxide 21 - 32 mmol/L 24   ANION GAP mmol/L 11   BUN 5 - 25 mg/dL 9   Creatinine 0.60 - 1.30 mg/dL 0.79   GLUCOSE 65 - 140 mg/dL 169 (H)   (L): Data is abnormally low  (H): Data is abnormally high    Imaging Studies: I have personally reviewed pertinent reports.      Portions of the record may have been created with voice recognition software.

## 2024-02-01 LAB
ALBUMIN SERPL BCP-MCNC: 3.8 G/DL (ref 3.5–5)
ALP SERPL-CCNC: 67 U/L (ref 34–104)
ALT SERPL W P-5'-P-CCNC: 15 U/L (ref 7–52)
ANION GAP SERPL CALCULATED.3IONS-SCNC: 10 MMOL/L
ANION GAP SERPL CALCULATED.3IONS-SCNC: 10 MMOL/L
ANION GAP SERPL CALCULATED.3IONS-SCNC: 17 MMOL/L
ANION GAP SERPL CALCULATED.3IONS-SCNC: 18 MMOL/L
ANION GAP SERPL CALCULATED.3IONS-SCNC: 18 MMOL/L
AST SERPL W P-5'-P-CCNC: 37 U/L (ref 13–39)
BASE EX.OXY STD BLDV CALC-SCNC: 92.7 % (ref 60–80)
BASE EXCESS BLDV CALC-SCNC: -3.1 MMOL/L
BASOPHILS # BLD AUTO: 0.04 THOUSANDS/ÂΜL (ref 0–0.1)
BASOPHILS NFR BLD AUTO: 0 % (ref 0–1)
BETA-HYDROXYBUTYRATE: 2.3 MMOL/L
BILIRUB SERPL-MCNC: 2.63 MG/DL (ref 0.2–1)
BUN SERPL-MCNC: 10 MG/DL (ref 5–25)
BUN SERPL-MCNC: 7 MG/DL (ref 5–25)
BUN SERPL-MCNC: 8 MG/DL (ref 5–25)
CALCIUM SERPL-MCNC: 8.2 MG/DL (ref 8.4–10.2)
CALCIUM SERPL-MCNC: 8.3 MG/DL (ref 8.4–10.2)
CALCIUM SERPL-MCNC: 8.4 MG/DL (ref 8.4–10.2)
CALCIUM SERPL-MCNC: 8.7 MG/DL (ref 8.4–10.2)
CALCIUM SERPL-MCNC: 9 MG/DL (ref 8.4–10.2)
CHLORIDE SERPL-SCNC: 100 MMOL/L (ref 96–108)
CHLORIDE SERPL-SCNC: 97 MMOL/L (ref 96–108)
CHLORIDE SERPL-SCNC: 98 MMOL/L (ref 96–108)
CHLORIDE SERPL-SCNC: 98 MMOL/L (ref 96–108)
CHLORIDE SERPL-SCNC: 99 MMOL/L (ref 96–108)
CO2 SERPL-SCNC: 19 MMOL/L (ref 21–32)
CO2 SERPL-SCNC: 19 MMOL/L (ref 21–32)
CO2 SERPL-SCNC: 20 MMOL/L (ref 21–32)
CO2 SERPL-SCNC: 27 MMOL/L (ref 21–32)
CO2 SERPL-SCNC: 28 MMOL/L (ref 21–32)
CREAT SERPL-MCNC: 0.65 MG/DL (ref 0.6–1.3)
CREAT SERPL-MCNC: 0.69 MG/DL (ref 0.6–1.3)
CREAT SERPL-MCNC: 0.7 MG/DL (ref 0.6–1.3)
CREAT SERPL-MCNC: 0.75 MG/DL (ref 0.6–1.3)
CREAT SERPL-MCNC: 0.77 MG/DL (ref 0.6–1.3)
EOSINOPHIL # BLD AUTO: 0.03 THOUSAND/ÂΜL (ref 0–0.61)
EOSINOPHIL NFR BLD AUTO: 0 % (ref 0–6)
ERYTHROCYTE [DISTWIDTH] IN BLOOD BY AUTOMATED COUNT: 12 % (ref 11.6–15.1)
GFR SERPL CREATININE-BSD FRML MDRD: 121 ML/MIN/1.73SQ M
GFR SERPL CREATININE-BSD FRML MDRD: 123 ML/MIN/1.73SQ M
GFR SERPL CREATININE-BSD FRML MDRD: 126 ML/MIN/1.73SQ M
GFR SERPL CREATININE-BSD FRML MDRD: 127 ML/MIN/1.73SQ M
GFR SERPL CREATININE-BSD FRML MDRD: 130 ML/MIN/1.73SQ M
GLUCOSE SERPL-MCNC: 118 MG/DL (ref 65–140)
GLUCOSE SERPL-MCNC: 120 MG/DL (ref 65–140)
GLUCOSE SERPL-MCNC: 130 MG/DL (ref 65–140)
GLUCOSE SERPL-MCNC: 210 MG/DL (ref 65–140)
GLUCOSE SERPL-MCNC: 227 MG/DL (ref 65–140)
GLUCOSE SERPL-MCNC: 236 MG/DL (ref 65–140)
GLUCOSE SERPL-MCNC: 267 MG/DL (ref 65–140)
GLUCOSE SERPL-MCNC: 79 MG/DL (ref 65–140)
GLUCOSE SERPL-MCNC: 82 MG/DL (ref 65–140)
HCO3 BLDV-SCNC: 20.5 MMOL/L (ref 24–30)
HCT VFR BLD AUTO: 38.4 % (ref 36.5–49.3)
HGB BLD-MCNC: 13 G/DL (ref 12–17)
IMM GRANULOCYTES # BLD AUTO: 0.08 THOUSAND/UL (ref 0–0.2)
IMM GRANULOCYTES NFR BLD AUTO: 1 % (ref 0–2)
LYMPHOCYTES # BLD AUTO: 2.51 THOUSANDS/ÂΜL (ref 0.6–4.47)
LYMPHOCYTES NFR BLD AUTO: 18 % (ref 14–44)
MAGNESIUM SERPL-MCNC: 2.1 MG/DL (ref 1.9–2.7)
MCH RBC QN AUTO: 27.9 PG (ref 26.8–34.3)
MCHC RBC AUTO-ENTMCNC: 33.9 G/DL (ref 31.4–37.4)
MCV RBC AUTO: 82 FL (ref 82–98)
MONOCYTES # BLD AUTO: 1.03 THOUSAND/ÂΜL (ref 0.17–1.22)
MONOCYTES NFR BLD AUTO: 7 % (ref 4–12)
NEUTROPHILS # BLD AUTO: 10.26 THOUSANDS/ÂΜL (ref 1.85–7.62)
NEUTS SEG NFR BLD AUTO: 74 % (ref 43–75)
NRBC BLD AUTO-RTO: 0 /100 WBCS
O2 CT BLDV-SCNC: 17.9 ML/DL
PCO2 BLDV: 32.7 MM HG (ref 42–50)
PH BLDV: 7.42 [PH] (ref 7.3–7.4)
PHOSPHATE SERPL-MCNC: 2.3 MG/DL (ref 2.7–4.5)
PLATELET # BLD AUTO: 182 THOUSANDS/UL (ref 149–390)
PMV BLD AUTO: 11.4 FL (ref 8.9–12.7)
PO2 BLDV: 72.4 MM HG (ref 35–45)
POTASSIUM SERPL-SCNC: 3.4 MMOL/L (ref 3.5–5.3)
POTASSIUM SERPL-SCNC: 3.7 MMOL/L (ref 3.5–5.3)
POTASSIUM SERPL-SCNC: 3.8 MMOL/L (ref 3.5–5.3)
POTASSIUM SERPL-SCNC: 3.9 MMOL/L (ref 3.5–5.3)
POTASSIUM SERPL-SCNC: 3.9 MMOL/L (ref 3.5–5.3)
PROCALCITONIN SERPL-MCNC: 0.45 NG/ML
PROT SERPL-MCNC: 7 G/DL (ref 6.4–8.4)
RBC # BLD AUTO: 4.66 MILLION/UL (ref 3.88–5.62)
SODIUM SERPL-SCNC: 135 MMOL/L (ref 135–147)
SODIUM SERPL-SCNC: 136 MMOL/L (ref 135–147)
SODIUM SERPL-SCNC: 137 MMOL/L (ref 135–147)
WBC # BLD AUTO: 13.95 THOUSAND/UL (ref 4.31–10.16)

## 2024-02-01 PROCEDURE — 80053 COMPREHEN METABOLIC PANEL: CPT | Performed by: INTERNAL MEDICINE

## 2024-02-01 PROCEDURE — 85025 COMPLETE CBC W/AUTO DIFF WBC: CPT | Performed by: INTERNAL MEDICINE

## 2024-02-01 PROCEDURE — 80048 BASIC METABOLIC PNL TOTAL CA: CPT

## 2024-02-01 PROCEDURE — 80048 BASIC METABOLIC PNL TOTAL CA: CPT | Performed by: INTERNAL MEDICINE

## 2024-02-01 PROCEDURE — 99232 SBSQ HOSP IP/OBS MODERATE 35: CPT | Performed by: STUDENT IN AN ORGANIZED HEALTH CARE EDUCATION/TRAINING PROGRAM

## 2024-02-01 PROCEDURE — 84145 PROCALCITONIN (PCT): CPT | Performed by: INTERNAL MEDICINE

## 2024-02-01 PROCEDURE — 84100 ASSAY OF PHOSPHORUS: CPT | Performed by: INTERNAL MEDICINE

## 2024-02-01 PROCEDURE — 83735 ASSAY OF MAGNESIUM: CPT | Performed by: INTERNAL MEDICINE

## 2024-02-01 PROCEDURE — 82948 REAGENT STRIP/BLOOD GLUCOSE: CPT

## 2024-02-01 PROCEDURE — 82010 KETONE BODYS QUAN: CPT | Performed by: STUDENT IN AN ORGANIZED HEALTH CARE EDUCATION/TRAINING PROGRAM

## 2024-02-01 PROCEDURE — 99232 SBSQ HOSP IP/OBS MODERATE 35: CPT | Performed by: INTERNAL MEDICINE

## 2024-02-01 PROCEDURE — 82805 BLOOD GASES W/O2 SATURATION: CPT | Performed by: INTERNAL MEDICINE

## 2024-02-01 RX ORDER — INSULIN LISPRO 100 [IU]/ML
2-12 INJECTION, SOLUTION INTRAVENOUS; SUBCUTANEOUS
Status: DISCONTINUED | OUTPATIENT
Start: 2024-02-01 | End: 2024-02-02 | Stop reason: HOSPADM

## 2024-02-01 RX ORDER — SODIUM CHLORIDE, SODIUM GLUCONATE, SODIUM ACETATE, POTASSIUM CHLORIDE, MAGNESIUM CHLORIDE, SODIUM PHOSPHATE, DIBASIC, AND POTASSIUM PHOSPHATE .53; .5; .37; .037; .03; .012; .00082 G/100ML; G/100ML; G/100ML; G/100ML; G/100ML; G/100ML; G/100ML
1000 INJECTION, SOLUTION INTRAVENOUS ONCE
Status: COMPLETED | OUTPATIENT
Start: 2024-02-01 | End: 2024-02-01

## 2024-02-01 RX ORDER — INSULIN GLARGINE 100 [IU]/ML
20 INJECTION, SOLUTION SUBCUTANEOUS
Status: DISCONTINUED | OUTPATIENT
Start: 2024-02-02 | End: 2024-02-02 | Stop reason: HOSPADM

## 2024-02-01 RX ORDER — SODIUM CHLORIDE, SODIUM GLUCONATE, SODIUM ACETATE, POTASSIUM CHLORIDE, MAGNESIUM CHLORIDE, SODIUM PHOSPHATE, DIBASIC, AND POTASSIUM PHOSPHATE .53; .5; .37; .037; .03; .012; .00082 G/100ML; G/100ML; G/100ML; G/100ML; G/100ML; G/100ML; G/100ML
125 INJECTION, SOLUTION INTRAVENOUS CONTINUOUS
Status: DISCONTINUED | OUTPATIENT
Start: 2024-02-01 | End: 2024-02-02

## 2024-02-01 RX ORDER — INSULIN GLARGINE 100 [IU]/ML
5 INJECTION, SOLUTION SUBCUTANEOUS ONCE
Status: COMPLETED | OUTPATIENT
Start: 2024-02-01 | End: 2024-02-01

## 2024-02-01 RX ORDER — INSULIN LISPRO 100 [IU]/ML
5 INJECTION, SOLUTION INTRAVENOUS; SUBCUTANEOUS
Status: DISCONTINUED | OUTPATIENT
Start: 2024-02-01 | End: 2024-02-02 | Stop reason: HOSPADM

## 2024-02-01 RX ADMIN — DIBASIC SODIUM PHOSPHATE, MONOBASIC POTASSIUM PHOSPHATE AND MONOBASIC SODIUM PHOSPHATE 1 TABLET: 852; 155; 130 TABLET ORAL at 11:36

## 2024-02-01 RX ADMIN — INSULIN LISPRO 5 UNITS: 100 INJECTION, SOLUTION INTRAVENOUS; SUBCUTANEOUS at 11:39

## 2024-02-01 RX ADMIN — HEPARIN SODIUM 5000 UNITS: 5000 INJECTION INTRAVENOUS; SUBCUTANEOUS at 14:26

## 2024-02-01 RX ADMIN — SODIUM CHLORIDE, SODIUM GLUCONATE, SODIUM ACETATE, POTASSIUM CHLORIDE, MAGNESIUM CHLORIDE, SODIUM PHOSPHATE, DIBASIC, AND POTASSIUM PHOSPHATE 1000 ML: .53; .5; .37; .037; .03; .012; .00082 INJECTION, SOLUTION INTRAVENOUS at 11:34

## 2024-02-01 RX ADMIN — SODIUM CHLORIDE, SODIUM GLUCONATE, SODIUM ACETATE, POTASSIUM CHLORIDE, MAGNESIUM CHLORIDE, SODIUM PHOSPHATE, DIBASIC, AND POTASSIUM PHOSPHATE 100 ML/HR: .53; .5; .37; .037; .03; .012; .00082 INJECTION, SOLUTION INTRAVENOUS at 04:34

## 2024-02-01 RX ADMIN — POTASSIUM PHOSPHATE, MONOBASIC POTASSIUM PHOSPHATE, DIBASIC 12 MMOL: 224; 236 INJECTION, SOLUTION, CONCENTRATE INTRAVENOUS at 09:34

## 2024-02-01 RX ADMIN — SODIUM CHLORIDE, SODIUM GLUCONATE, SODIUM ACETATE, POTASSIUM CHLORIDE, MAGNESIUM CHLORIDE, SODIUM PHOSPHATE, DIBASIC, AND POTASSIUM PHOSPHATE 125 ML/HR: .53; .5; .37; .037; .03; .012; .00082 INJECTION, SOLUTION INTRAVENOUS at 08:02

## 2024-02-01 RX ADMIN — INSULIN GLARGINE 15 UNITS: 100 INJECTION, SOLUTION SUBCUTANEOUS at 08:04

## 2024-02-01 RX ADMIN — SODIUM CHLORIDE, SODIUM GLUCONATE, SODIUM ACETATE, POTASSIUM CHLORIDE, MAGNESIUM CHLORIDE, SODIUM PHOSPHATE, DIBASIC, AND POTASSIUM PHOSPHATE 125 ML/HR: .53; .5; .37; .037; .03; .012; .00082 INJECTION, SOLUTION INTRAVENOUS at 18:35

## 2024-02-01 RX ADMIN — DIBASIC SODIUM PHOSPHATE, MONOBASIC POTASSIUM PHOSPHATE AND MONOBASIC SODIUM PHOSPHATE 1 TABLET: 852; 155; 130 TABLET ORAL at 08:02

## 2024-02-01 RX ADMIN — HEPARIN SODIUM 5000 UNITS: 5000 INJECTION INTRAVENOUS; SUBCUTANEOUS at 22:11

## 2024-02-01 RX ADMIN — INSULIN LISPRO 2 UNITS: 100 INJECTION, SOLUTION INTRAVENOUS; SUBCUTANEOUS at 08:04

## 2024-02-01 RX ADMIN — SODIUM CHLORIDE, SODIUM GLUCONATE, SODIUM ACETATE, POTASSIUM CHLORIDE, MAGNESIUM CHLORIDE, SODIUM PHOSPHATE, DIBASIC, AND POTASSIUM PHOSPHATE 125 ML/HR: .53; .5; .37; .037; .03; .012; .00082 INJECTION, SOLUTION INTRAVENOUS at 20:39

## 2024-02-01 RX ADMIN — HEPARIN SODIUM 5000 UNITS: 5000 INJECTION INTRAVENOUS; SUBCUTANEOUS at 05:45

## 2024-02-01 RX ADMIN — INSULIN GLARGINE 5 UNITS: 100 INJECTION, SOLUTION SUBCUTANEOUS at 11:38

## 2024-02-01 RX ADMIN — SODIUM CHLORIDE, SODIUM GLUCONATE, SODIUM ACETATE, POTASSIUM CHLORIDE, MAGNESIUM CHLORIDE, SODIUM PHOSPHATE, DIBASIC, AND POTASSIUM PHOSPHATE 1000 ML: .53; .5; .37; .037; .03; .012; .00082 INJECTION, SOLUTION INTRAVENOUS at 05:45

## 2024-02-01 RX ADMIN — INSULIN LISPRO 6 UNITS: 100 INJECTION, SOLUTION INTRAVENOUS; SUBCUTANEOUS at 08:04

## 2024-02-01 NOTE — PLAN OF CARE
Problem: Potential for Falls  Goal: Patient will remain free of falls  Description: INTERVENTIONS:  - Educate patient/family on patient safety including physical limitations  - Instruct patient to call for assistance with activity   - Consult OT/PT to assist with strengthening/mobility   - Keep Call bell within reach  - Keep bed low and locked with side rails adjusted as appropriate  - Keep care items and personal belongings within reach  - Initiate and maintain comfort rounds  - Make Fall Risk Sign visible to staff  - Apply yellow socks and bracelet for high fall risk patients  - Consider moving patient to room near nurses station  Outcome: Progressing     Problem: PAIN - ADULT  Goal: Verbalizes/displays adequate comfort level or baseline comfort level  Description: Interventions:  - Encourage patient to monitor pain and request assistance  - Assess pain using appropriate pain scale  - Administer analgesics based on type and severity of pain and evaluate response  - Implement non-pharmacological measures as appropriate and evaluate response  - Consider cultural and social influences on pain and pain management  - Notify physician/advanced practitioner if interventions unsuccessful or patient reports new pain  Outcome: Progressing     Problem: INFECTION - ADULT  Goal: Absence or prevention of progression during hospitalization  Description: INTERVENTIONS:  - Assess and monitor for signs and symptoms of infection  - Monitor lab/diagnostic results  - Monitor all insertion sites, i.e. indwelling lines, tubes, and drains  - Monitor endotracheal if appropriate and nasal secretions for changes in amount and color  - [place] appropriate cooling/warming therapies per order  - Administer medications as ordered  - Instruct and encourage patient and family to use good hand hygiene technique  - Identify and instruct in appropriate isolation precautions for identified infection/condition  Outcome: Progressing  Goal: Absence  of fever/infection during neutropenic period  Description: INTERVENTIONS:  - Monitor WBC    Outcome: Progressing     Problem: SAFETY ADULT  Goal: Patient will remain free of falls  Description: INTERVENTIONS:  - Educate patient/family on patient safety including physical limitations  - Instruct patient to call for assistance with activity   - Consult OT/PT to assist with strengthening/mobility   - Keep Call bell within reach  - Keep bed low and locked with side rails adjusted as appropriate  - Keep care items and personal belongings within reach  - Initiate and maintain comfort rounds  - Make Fall Risk Sign visible to staff  - Apply yellow socks and bracelet for high fall risk patients  - Consider moving patient to room near nurses station  Outcome: Progressing  Goal: Maintain or return to baseline ADL function  Description: INTERVENTIONS:  -  Assess patient's ability to carry out ADLs; assess patient's baseline for ADL function and identify physical deficits which impact ability to perform ADLs (bathing, care of mouth/teeth, toileting, grooming, dressing, etc.)  - Assess/evaluate cause of self-care deficits   - Assess range of motion  - Assess patient's mobility; develop plan if impaired  - Assess patient's need for assistive devices and provide as appropriate  - Encourage maximum independence but intervene and supervise when necessary  - Involve family in performance of ADLs  - Assess for home care needs following discharge   - Consider OT consult to assist with ADL evaluation and planning for discharge  - Provide patient education as appropriate  Outcome: Progressing  Goal: Maintains/Returns to pre admission functional level  Description: INTERVENTIONS:  - Perform AM-PAC 6 Click Basic Mobility/ Daily Activity assessment daily.  - Set and communicate daily mobility goal to care team and patient/family/caregiver.   - Collaborate with rehabilitation services on mobility goals if consulted  - Perform Range of Motion  3 times a day.  - Stand patient 3 times a day  - Ambulate patient 2 times a day  - Out of bed to chair 2 times a day   - Out of bed for meals 2 times a day  - Out of bed for toileting  - Record patient progress and toleration of activity level   Outcome: Progressing     Problem: DISCHARGE PLANNING  Goal: Discharge to home or other facility with appropriate resources  Description: INTERVENTIONS:  - Identify barriers to discharge w/patient and caregiver  - Arrange for needed discharge resources and transportation as appropriate  - Identify discharge learning needs (meds, wound care, etc.)  - Arrange for interpretive services to assist at discharge as needed  - Refer to Case Management Department for coordinating discharge planning if the patient needs post-hospital services based on physician/advanced practitioner order or complex needs related to functional status, cognitive ability, or social support system  Outcome: Progressing     Problem: Knowledge Deficit  Goal: Patient/family/caregiver demonstrates understanding of disease process, treatment plan, medications, and discharge instructions  Description: Complete learning assessment and assess knowledge base.  Interventions:  - Provide teaching at level of understanding  - Provide teaching via preferred learning methods  Outcome: Progressing     Problem: Nutrition/Hydration-ADULT  Goal: Nutrient/Hydration intake appropriate for improving, restoring or maintaining nutritional needs  Description: Monitor and assess patient's nutrition/hydration status for malnutrition. Collaborate with interdisciplinary team and initiate plan and interventions as ordered.  Monitor patient's weight and dietary intake as ordered or per policy. Utilize nutrition screening tool and intervene as necessary. Determine patient's food preferences and provide high-protein, high-caloric foods as appropriate.     INTERVENTIONS:  - Monitor oral intake, urinary output, labs, and treatment plans  -  Assess nutrition and hydration status and recommend course of action  - Evaluate amount of meals eaten  - Assist patient with eating if necessary   - Allow adequate time for meals  - Recommend/ encourage appropriate diets, oral nutritional supplements, and vitamin/mineral supplements  - Order, calculate, and assess calorie counts as needed  - Recommend, monitor, and adjust tube feedings and TPN/PPN based on assessed needs  - Assess need for intravenous fluids  - Provide specific nutrition/hydration education as appropriate  - Include patient/family/caregiver in decisions related to nutrition  Outcome: Progressing

## 2024-02-01 NOTE — QUICK NOTE
Received sepsis time zero for this patient - SIRS tachycardia and leukocytosis of 13.95. Tbili elevated at 2.63. No bacterial source of infection at this time and WBC and procal both improving from 1/31 labs. Continue to monitor off antibiotics as no signs of infection at this time.

## 2024-02-01 NOTE — ASSESSMENT & PLAN NOTE
Phos 10.4 on admission  Likely secondary to SHREYAS and severe hypovolemia  Will order potassium phosphate IV as now pt has hypophosphatemia

## 2024-02-01 NOTE — ASSESSMENT & PLAN NOTE
Baseline Cr 0.9 in care everywhere  Cr on admission 1.94  UA bland  Likely prerenal 2/2 severe DKA, intravascular dehydration   Aggressive IVF resuscitation   Creatinine is 0.77  Resolved

## 2024-02-01 NOTE — ASSESSMENT & PLAN NOTE
Pt reports 2 days of N/V/D   Denies abdominal pain  Likely secondary to severe DKA  On Reglan  Antiemetics as needed  Improving.  Currently on clear liquid diet.  Will advance as tolerated.

## 2024-02-01 NOTE — PROGRESS NOTES
"Progress Note - Jaun Loza III 28 y.o. male MRN: [id number]    Unit/Bed#: -01 Encounter: [id number]      CC: diabetes f/u    Subjective:   Jaun Loza III is a 28 y.o. year old male with type 1 diabetes admitted for DKA with DKA resolved yesterday transitioned to MDI.     Since last 24hrs, patient reports he is feeling much better and tried to eat solid food today and tolerated it and feels ok. This was his first meal. Was not able to eat much last night His AG started to raise again slowly from 11 to 18 with HCO3- in 20's range. BG has been slightly higher in upper 200's range    Objective:     Vitals: Blood pressure (!) 148/105, pulse 95, temperature 98.4 °F (36.9 °C), temperature source Oral, resp. rate 16, height 5' 6\" (1.676 m), weight 61.5 kg (135 lb 9.3 oz), SpO2 99%.,Body mass index is 21.88 kg/m².      Intake/Output Summary (Last 24 hours) at 2/1/2024 1303  Last data filed at 2/1/2024 1141  Gross per 24 hour   Intake 3457.55 ml   Output 2850 ml   Net 607.55 ml       Physical Exam:  General Appearance: awake, appears stated age and cooperative  Head: Normocephalic, without obvious abnormality, atraumatic  Extremities: moves all extremities  Skin: Skin color and temperature normal.   Pulm: no labored breathing    Lab, Imaging and other studies: I have personally reviewed pertinent reports.      POC Glucose (mg/dl)   Date Value   02/01/2024 130   02/01/2024 267 (H)   01/31/2024 192 (H)   01/31/2024 163 (H)   01/31/2024 127   01/31/2024 64 (L)   01/31/2024 162 (H)   01/31/2024 230 (H)   01/31/2024 181 (H)   01/31/2024 165 (H)       Assessment:  diabetes with hyperglycemia  Assessment and plan:  T1DM without any known complications admitted for DKA d/t poor compliance and inaqdequate OP care. DKA resolved with some lingering symptoms of gastroperesis and poor PO intake. Transitioned off insulin drip to MDI. Started on conservative doses given poor PO intake but BG have been elevated in the " last 24hrs indicating higher insulin need. Thus will increase both basal/bolus for now. Mild risk of DKA yesterday when on limited PO intake but now that eating better, no concern for recurrence of DKA    Plan:  - inc lantus 20u   - inc Lispro 5u with meals + ISS        Portions of the record may have been created with voice recognition software.

## 2024-02-01 NOTE — ASSESSMENT & PLAN NOTE
Lab Results   Component Value Date    HGBA1C 9.0 (H) 01/29/2024       Recent Labs     01/31/24  1458 01/31/24  1639 01/31/24  2236 02/01/24  0609   POCGLU 127 163* 192* 267*         Blood Sugar Average: Last 72 hrs:  (P) 182.6524499786588032  Hx of T1DM on SQ insulin regimen  Pt reports running out of insulin needles 2 days ago. He has since had progressive N/V/D and polydipsia  DKA POA AEB: pH 6.98, CO2 7, AG 38, , BHB 5.2  Initiate DKA protocol  IVF per protocol; transition to dextrose-containing IVF when BG < 250  DKA insulin gtt; transition to non-DKA insulin gtt   Insulin drip is discontinued and patient started on Lantus insulin by endocrinology  On Humalog with meals and sliding scale  Q1h BG  Endocrinology on board  Clear liquid diet.  Will advance as tolerated  Trend BMP  Replete electrolytes PRN with goal K>4 and Mag>2  Will need to ensure refill of insulin needles prior to d/c

## 2024-02-02 VITALS
HEIGHT: 66 IN | TEMPERATURE: 99.1 F | OXYGEN SATURATION: 98 % | WEIGHT: 134.48 LBS | BODY MASS INDEX: 21.61 KG/M2 | DIASTOLIC BLOOD PRESSURE: 94 MMHG | HEART RATE: 101 BPM | SYSTOLIC BLOOD PRESSURE: 137 MMHG | RESPIRATION RATE: 16 BRPM

## 2024-02-02 LAB
ALBUMIN SERPL BCP-MCNC: 3.8 G/DL (ref 3.5–5)
ALP SERPL-CCNC: 62 U/L (ref 34–104)
ALT SERPL W P-5'-P-CCNC: 15 U/L (ref 7–52)
ANION GAP SERPL CALCULATED.3IONS-SCNC: 12 MMOL/L
AST SERPL W P-5'-P-CCNC: 31 U/L (ref 13–39)
BASOPHILS # BLD AUTO: 0.02 THOUSANDS/ÂΜL (ref 0–0.1)
BASOPHILS NFR BLD AUTO: 0 % (ref 0–1)
BILIRUB SERPL-MCNC: 2.57 MG/DL (ref 0.2–1)
BUN SERPL-MCNC: 8 MG/DL (ref 5–25)
CALCIUM SERPL-MCNC: 8.7 MG/DL (ref 8.4–10.2)
CHLORIDE SERPL-SCNC: 96 MMOL/L (ref 96–108)
CO2 SERPL-SCNC: 29 MMOL/L (ref 21–32)
CREAT SERPL-MCNC: 0.63 MG/DL (ref 0.6–1.3)
EOSINOPHIL # BLD AUTO: 0.16 THOUSAND/ÂΜL (ref 0–0.61)
EOSINOPHIL NFR BLD AUTO: 2 % (ref 0–6)
ERYTHROCYTE [DISTWIDTH] IN BLOOD BY AUTOMATED COUNT: 11.7 % (ref 11.6–15.1)
GFR SERPL CREATININE-BSD FRML MDRD: 132 ML/MIN/1.73SQ M
GLUCOSE SERPL-MCNC: 134 MG/DL (ref 65–140)
GLUCOSE SERPL-MCNC: 135 MG/DL (ref 65–140)
GLUCOSE SERPL-MCNC: 139 MG/DL (ref 65–140)
GLUCOSE SERPL-MCNC: 208 MG/DL (ref 65–140)
HCT VFR BLD AUTO: 39.1 % (ref 36.5–49.3)
HGB BLD-MCNC: 13.4 G/DL (ref 12–17)
IMM GRANULOCYTES # BLD AUTO: 0.04 THOUSAND/UL (ref 0–0.2)
IMM GRANULOCYTES NFR BLD AUTO: 1 % (ref 0–2)
LYMPHOCYTES # BLD AUTO: 2.98 THOUSANDS/ÂΜL (ref 0.6–4.47)
LYMPHOCYTES NFR BLD AUTO: 36 % (ref 14–44)
MAGNESIUM SERPL-MCNC: 2.2 MG/DL (ref 1.9–2.7)
MCH RBC QN AUTO: 27.9 PG (ref 26.8–34.3)
MCHC RBC AUTO-ENTMCNC: 34.3 G/DL (ref 31.4–37.4)
MCV RBC AUTO: 82 FL (ref 82–98)
MONOCYTES # BLD AUTO: 0.67 THOUSAND/ÂΜL (ref 0.17–1.22)
MONOCYTES NFR BLD AUTO: 8 % (ref 4–12)
NEUTROPHILS # BLD AUTO: 4.36 THOUSANDS/ÂΜL (ref 1.85–7.62)
NEUTS SEG NFR BLD AUTO: 53 % (ref 43–75)
NRBC BLD AUTO-RTO: 0 /100 WBCS
PHOSPHATE SERPL-MCNC: 2.9 MG/DL (ref 2.7–4.5)
PLATELET # BLD AUTO: 172 THOUSANDS/UL (ref 149–390)
PMV BLD AUTO: 10.8 FL (ref 8.9–12.7)
POTASSIUM SERPL-SCNC: 3.4 MMOL/L (ref 3.5–5.3)
PROT SERPL-MCNC: 6.9 G/DL (ref 6.4–8.4)
RBC # BLD AUTO: 4.8 MILLION/UL (ref 3.88–5.62)
SODIUM SERPL-SCNC: 137 MMOL/L (ref 135–147)
WBC # BLD AUTO: 8.23 THOUSAND/UL (ref 4.31–10.16)

## 2024-02-02 PROCEDURE — 83735 ASSAY OF MAGNESIUM: CPT

## 2024-02-02 PROCEDURE — 84100 ASSAY OF PHOSPHORUS: CPT

## 2024-02-02 PROCEDURE — 82948 REAGENT STRIP/BLOOD GLUCOSE: CPT

## 2024-02-02 PROCEDURE — 99239 HOSP IP/OBS DSCHRG MGMT >30: CPT | Performed by: INTERNAL MEDICINE

## 2024-02-02 PROCEDURE — 85025 COMPLETE CBC W/AUTO DIFF WBC: CPT

## 2024-02-02 PROCEDURE — 80053 COMPREHEN METABOLIC PANEL: CPT

## 2024-02-02 RX ORDER — BLOOD-GLUCOSE METER
KIT MISCELLANEOUS
Qty: 1 KIT | Refills: 0 | Status: SHIPPED | OUTPATIENT
Start: 2024-02-02

## 2024-02-02 RX ORDER — GLUCOSAMINE HCL/CHONDROITIN SU 500-400 MG
CAPSULE ORAL
Qty: 100 EACH | Refills: 0 | Status: SHIPPED | OUTPATIENT
Start: 2024-02-02

## 2024-02-02 RX ORDER — POTASSIUM CHLORIDE 20 MEQ/1
40 TABLET, EXTENDED RELEASE ORAL ONCE
Status: COMPLETED | OUTPATIENT
Start: 2024-02-02 | End: 2024-02-02

## 2024-02-02 RX ORDER — INSULIN GLARGINE 100 [IU]/ML
20 INJECTION, SOLUTION SUBCUTANEOUS
Qty: 6 ML | Refills: 0 | Status: SHIPPED | OUTPATIENT
Start: 2024-02-02 | End: 2024-03-03

## 2024-02-02 RX ORDER — BLOOD SUGAR DIAGNOSTIC
STRIP MISCELLANEOUS
Qty: 100 EACH | Refills: 0 | Status: SHIPPED | OUTPATIENT
Start: 2024-02-02

## 2024-02-02 RX ORDER — LANCETS 33 GAUGE
EACH MISCELLANEOUS
Qty: 100 EACH | Refills: 0 | Status: SHIPPED | OUTPATIENT
Start: 2024-02-02

## 2024-02-02 RX ORDER — INSULIN LISPRO 100 [IU]/ML
5 INJECTION, SOLUTION INTRAVENOUS; SUBCUTANEOUS
Qty: 4.5 ML | Refills: 0 | Status: SHIPPED | OUTPATIENT
Start: 2024-02-02 | End: 2024-03-03

## 2024-02-02 RX ADMIN — DIBASIC SODIUM PHOSPHATE, MONOBASIC POTASSIUM PHOSPHATE AND MONOBASIC SODIUM PHOSPHATE 1 TABLET: 852; 155; 130 TABLET ORAL at 11:33

## 2024-02-02 RX ADMIN — POTASSIUM CHLORIDE 40 MEQ: 1500 TABLET, EXTENDED RELEASE ORAL at 08:42

## 2024-02-02 RX ADMIN — INSULIN LISPRO 5 UNITS: 100 INJECTION, SOLUTION INTRAVENOUS; SUBCUTANEOUS at 12:01

## 2024-02-02 RX ADMIN — INSULIN LISPRO 5 UNITS: 100 INJECTION, SOLUTION INTRAVENOUS; SUBCUTANEOUS at 08:44

## 2024-02-02 RX ADMIN — HEPARIN SODIUM 5000 UNITS: 5000 INJECTION INTRAVENOUS; SUBCUTANEOUS at 05:20

## 2024-02-02 RX ADMIN — DIBASIC SODIUM PHOSPHATE, MONOBASIC POTASSIUM PHOSPHATE AND MONOBASIC SODIUM PHOSPHATE 1 TABLET: 852; 155; 130 TABLET ORAL at 08:42

## 2024-02-02 RX ADMIN — INSULIN LISPRO 4 UNITS: 100 INJECTION, SOLUTION INTRAVENOUS; SUBCUTANEOUS at 08:43

## 2024-02-02 RX ADMIN — INSULIN GLARGINE 20 UNITS: 100 INJECTION, SOLUTION SUBCUTANEOUS at 08:50

## 2024-02-02 RX ADMIN — SODIUM CHLORIDE, SODIUM GLUCONATE, SODIUM ACETATE, POTASSIUM CHLORIDE, MAGNESIUM CHLORIDE, SODIUM PHOSPHATE, DIBASIC, AND POTASSIUM PHOSPHATE 125 ML/HR: .53; .5; .37; .037; .03; .012; .00082 INJECTION, SOLUTION INTRAVENOUS at 05:42

## 2024-02-02 NOTE — ASSESSMENT & PLAN NOTE
Lab Results   Component Value Date    HGBA1C 9.0 (H) 01/29/2024       Recent Labs     02/01/24 2037 02/02/24  0529 02/02/24  0742 02/02/24  1037   POCGLU 120 139 208* 134         Blood Sugar Average: Last 72 hrs:  (P) 161.8612064098345665  Hx of T1DM on SQ insulin regimen  Pt reports running out of insulin needles 2 days ago. He has since had progressive N/V/D and polydipsia  DKA POA AEB: pH 6.98, CO2 7, AG 38, , BHB 5.2  Initiate DKA protocol  IVF per protocol; transition to dextrose-containing IVF when BG < 250  DKA insulin gtt; transition to non-DKA insulin gtt   Insulin drip is discontinued and patient started on Lantus insulin by endocrinology  On Humalog with meals and sliding scale  Q1h BG  Endocrinology on board  Patient is tolerating diet  Trend BMP  Replete electrolytes PRN with goal K>4 and Mag>2  Endocrinology recommended patient can be discharged on Lantus insulin 20 units daily and Humalog 5 units with meals.  Outpatient follow-up with them.  Patient was price checked for insulin and has 0 co-pay per   Patient will be discharged with all the diabetic supplies and outpatient follow-up with endocrinology

## 2024-02-02 NOTE — CASE MANAGEMENT
Case Management Progress Note    Patient name Jaun Loza III  Location /-01 MRN [id number]  : 1995 Date 2024       LOS (days): 4  Geometric Mean LOS (GMLOS) (days): 4.1  Days to GMLOS:0.3        OBJECTIVE:        Current admission status: Inpatient  Preferred Pharmacy:   [facility] #[id number] [address] [address]  Phone: [phone] Fax: [phone]    Primary Care Provider: No primary care provider on file.    Primary Insurance: [employer]  Secondary Insurance:     PROGRESS NOTE: Pt is medically cleared. Brett ride requested for 1pm. SO is working until later this afternoon and there is no other person pt can get to pick him up.

## 2024-02-02 NOTE — ASSESSMENT & PLAN NOTE
Baseline Cr 0.9 in care everywhere  Cr on admission 1.94  UA bland  Likely prerenal 2/2 severe DKA, intravascular dehydration   Received aggressive IVF resuscitation   Creatinine is 0.63  Resolved

## 2024-02-02 NOTE — ASSESSMENT & PLAN NOTE
Pt reports 2 days of N/V/D   Denies abdominal pain  Likely secondary to severe DKA  Antiemetics as needed  Patient is tolerating diet.  Resolved

## 2024-02-02 NOTE — PLAN OF CARE
Problem: Potential for Falls  Goal: Patient will remain free of falls  Description: INTERVENTIONS:  - Educate patient/family on patient safety including physical limitations  - Instruct patient to call for assistance with activity   - Consult OT/PT to assist with strengthening/mobility   - Keep Call bell within reach  - Keep bed low and locked with side rails adjusted as appropriate  - Keep care items and personal belongings within reach  - Initiate and maintain comfort rounds  - Make Fall Risk Sign visible to staff  - Offer Toileting every 2 Hours, in advance of need  - Initiate/Maintain alarm  - Obtain necessary fall risk management equipment:   - Apply yellow socks and bracelet for high fall risk patients  - Consider moving patient to room near nurses station  Outcome: Progressing     Problem: PAIN - ADULT  Goal: Verbalizes/displays adequate comfort level or baseline comfort level  Description: Interventions:  - Encourage patient to monitor pain and request assistance  - Assess pain using appropriate pain scale  - Administer analgesics based on type and severity of pain and evaluate response  - Implement non-pharmacological measures as appropriate and evaluate response  - Consider cultural and social influences on pain and pain management  - Notify physician/advanced practitioner if interventions unsuccessful or patient reports new pain  Outcome: Progressing     Problem: INFECTION - ADULT  Goal: Absence or prevention of progression during hospitalization  Description: INTERVENTIONS:  - Assess and monitor for signs and symptoms of infection  - Monitor lab/diagnostic results  - Monitor all insertion sites, i.e. indwelling lines, tubes, and drains  - Monitor endotracheal if appropriate and nasal secretions for changes in amount and color  - [place] appropriate cooling/warming therapies per order  - Administer medications as ordered  - Instruct and encourage patient and family to use good hand hygiene  technique  - Identify and instruct in appropriate isolation precautions for identified infection/condition  Outcome: Progressing  Goal: Absence of fever/infection during neutropenic period  Description: INTERVENTIONS:  - Monitor WBC    Outcome: Progressing     Problem: SAFETY ADULT  Goal: Patient will remain free of falls  Description: INTERVENTIONS:  - Educate patient/family on patient safety including physical limitations  - Instruct patient to call for assistance with activity   - Consult OT/PT to assist with strengthening/mobility   - Keep Call bell within reach  - Keep bed low and locked with side rails adjusted as appropriate  - Keep care items and personal belongings within reach  - Initiate and maintain comfort rounds  - Make Fall Risk Sign visible to staff  - Offer Toileting every 2 Hours, in advance of need  - Initiate/Maintain alarm  - Obtain necessary fall risk management equipment:   - Apply yellow socks and bracelet for high fall risk patients  - Consider moving patient to room near nurses station  Outcome: Progressing  Goal: Maintain or return to baseline ADL function  Description: INTERVENTIONS:  -  Assess patient's ability to carry out ADLs; assess patient's baseline for ADL function and identify physical deficits which impact ability to perform ADLs (bathing, care of mouth/teeth, toileting, grooming, dressing, etc.)  - Assess/evaluate cause of self-care deficits   - Assess range of motion  - Assess patient's mobility; develop plan if impaired  - Assess patient's need for assistive devices and provide as appropriate  - Encourage maximum independence but intervene and supervise when necessary  - Involve family in performance of ADLs  - Assess for home care needs following discharge   - Consider OT consult to assist with ADL evaluation and planning for discharge  - Provide patient education as appropriate  Outcome: Progressing  Goal: Maintains/Returns to pre admission functional level  Description:  INTERVENTIONS:  - Perform AM-PAC 6 Click Basic Mobility/ Daily Activity assessment daily.  - Set and communicate daily mobility goal to care team and patient/family/caregiver.   - Collaborate with rehabilitation services on mobility goals if consulted  - Perform Range of Motion 3 times a day.  - Reposition patient every 2 hours.  - Dangle patient 3 times a day  - Stand patient 3 times a day  - Ambulate patient 3 times a day  - Out of bed to chair 3 times a day   - Out of bed for meals 3 times a day  - Out of bed for toileting  - Record patient progress and toleration of activity level   Outcome: Progressing     Problem: DISCHARGE PLANNING  Goal: Discharge to home or other facility with appropriate resources  Description: INTERVENTIONS:  - Identify barriers to discharge w/patient and caregiver  - Arrange for needed discharge resources and transportation as appropriate  - Identify discharge learning needs (meds, wound care, etc.)  - Arrange for interpretive services to assist at discharge as needed  - Refer to Case Management Department for coordinating discharge planning if the patient needs post-hospital services based on physician/advanced practitioner order or complex needs related to functional status, cognitive ability, or social support system  Outcome: Progressing     Problem: Knowledge Deficit  Goal: Patient/family/caregiver demonstrates understanding of disease process, treatment plan, medications, and discharge instructions  Description: Complete learning assessment and assess knowledge base.  Interventions:  - Provide teaching at level of understanding  - Provide teaching via preferred learning methods  Outcome: Progressing     Problem: Nutrition/Hydration-ADULT  Goal: Nutrient/Hydration intake appropriate for improving, restoring or maintaining nutritional needs  Description: Monitor and assess patient's nutrition/hydration status for malnutrition. Collaborate with interdisciplinary team and initiate plan  and interventions as ordered.  Monitor patient's weight and dietary intake as ordered or per policy. Utilize nutrition screening tool and intervene as necessary. Determine patient's food preferences and provide high-protein, high-caloric foods as appropriate.     INTERVENTIONS:  - Monitor oral intake, urinary output, labs, and treatment plans  - Assess nutrition and hydration status and recommend course of action  - Evaluate amount of meals eaten  - Assist patient with eating if necessary   - Allow adequate time for meals  - Recommend/ encourage appropriate diets, oral nutritional supplements, and vitamin/mineral supplements  - Order, calculate, and assess calorie counts as needed  - Recommend, monitor, and adjust tube feedings and TPN/PPN based on assessed needs  - Assess need for intravenous fluids  - Provide specific nutrition/hydration education as appropriate  - Include patient/family/caregiver in decisions related to nutrition  Outcome: Progressing     Problem: Prexisting or High Potential for Compromised Skin Integrity  Goal: Skin integrity is maintained or improved  Description: INTERVENTIONS:  - Identify patients at risk for skin breakdown  - Assess and monitor skin integrity  - Assess and monitor nutrition and hydration status  - Monitor labs   - Assess for incontinence   - Turn and reposition patient  - Assist with mobility/ambulation  - Relieve pressure over bony prominences  - Avoid friction and shearing  - Provide appropriate hygiene as needed including keeping skin clean and dry  - Evaluate need for skin moisturizer/barrier cream  - Collaborate with interdisciplinary team   - Patient/family teaching  - Consider wound care consult   Outcome: Progressing

## 2024-02-02 NOTE — ASSESSMENT & PLAN NOTE
Phos 10.4 on admission  Likely secondary to SHREYAS and severe hypovolemia  Received potassium phosphate IV as now pt has hypophosphatemia  Resolved

## 2024-02-02 NOTE — QUICK NOTE
Patient not seen but chart reviewed.   BG improved since last 24hrs.   Would recommend d/c on current regime and outpatient endocrine referral     Please discharge with insulin, needles supplies

## 2024-02-02 NOTE — CASE MANAGEMENT
Case Management Progress Note    Patient name Jaun Loza III  Location /-01 MRN [id number]  : 1995 Date 2024       LOS (days): 4  Geometric Mean LOS (GMLOS) (days):   Days to GMLOS:        OBJECTIVE:        Current admission status: Inpatient  Preferred Pharmacy:   [facility]/pharm[facility] #[id number] - [address] [address]  Phone: [phone] Fax: [phone]    Primary Care Provider: No primary care provider on file.    Primary Insurance: [employer] MA O  Secondary Insurance:     PROGRESS NOTE: cm asked to price check lantus. Cm spoke with pts pharmacy. There was a script for humalog and lantus. Both are a $0.00 copay.

## 2024-02-02 NOTE — PLAN OF CARE
Problem: PAIN - ADULT  Goal: Verbalizes/displays adequate comfort level or baseline comfort level  Description: Interventions:  - Encourage patient to monitor pain and request assistance  - Assess pain using appropriate pain scale  - Administer analgesics based on type and severity of pain and evaluate response  - Implement non-pharmacological measures as appropriate and evaluate response  - Consider cultural and social influences on pain and pain management  - Notify physician/advanced practitioner if interventions unsuccessful or patient reports new pain  Outcome: Progressing     Problem: INFECTION - ADULT  Goal: Absence or prevention of progression during hospitalization  Description: INTERVENTIONS:  - Assess and monitor for signs and symptoms of infection  - Monitor lab/diagnostic results  - Monitor all insertion sites, i.e. indwelling lines, tubes, and drains  - Monitor endotracheal if appropriate and nasal secretions for changes in amount and color  - [place] appropriate cooling/warming therapies per order  - Administer medications as ordered  - Instruct and encourage patient and family to use good hand hygiene technique  - Identify and instruct in appropriate isolation precautions for identified infection/condition  Outcome: Progressing  Goal: Absence of fever/infection during neutropenic period  Description: INTERVENTIONS:  - Monitor WBC    Outcome: Progressing     Problem: SAFETY ADULT  Goal: Maintain or return to baseline ADL function  Description: INTERVENTIONS:  -  Assess patient's ability to carry out ADLs; assess patient's baseline for ADL function and identify physical deficits which impact ability to perform ADLs (bathing, care of mouth/teeth, toileting, grooming, dressing, etc.)  - Assess/evaluate cause of self-care deficits   - Assess range of motion  - Assess patient's mobility; develop plan if impaired  - Assess patient's need for assistive devices and provide as appropriate  - Encourage  maximum independence but intervene and supervise when necessary  - Involve family in performance of ADLs  - Assess for home care needs following discharge   - Consider OT consult to assist with ADL evaluation and planning for discharge  - Provide patient education as appropriate  Outcome: Progressing     Problem: DISCHARGE PLANNING  Goal: Discharge to home or other facility with appropriate resources  Description: INTERVENTIONS:  - Identify barriers to discharge w/patient and caregiver  - Arrange for needed discharge resources and transportation as appropriate  - Identify discharge learning needs (meds, wound care, etc.)  - Arrange for interpretive services to assist at discharge as needed  - Refer to Case Management Department for coordinating discharge planning if the patient needs post-hospital services based on physician/advanced practitioner order or complex needs related to functional status, cognitive ability, or social support system  Outcome: Progressing     Problem: Knowledge Deficit  Goal: Patient/family/caregiver demonstrates understanding of disease process, treatment plan, medications, and discharge instructions  Description: Complete learning assessment and assess knowledge base.  Interventions:  - Provide teaching at level of understanding  - Provide teaching via preferred learning methods  Outcome: Progressing     Problem: Nutrition/Hydration-ADULT  Goal: Nutrient/Hydration intake appropriate for improving, restoring or maintaining nutritional needs  Description: Monitor and assess patient's nutrition/hydration status for malnutrition. Collaborate with interdisciplinary team and initiate plan and interventions as ordered.  Monitor patient's weight and dietary intake as ordered or per policy. Utilize nutrition screening tool and intervene as necessary. Determine patient's food preferences and provide high-protein, high-caloric foods as appropriate.     INTERVENTIONS:  - Monitor oral intake, urinary  output, labs, and treatment plans  - Assess nutrition and hydration status and recommend course of action  - Evaluate amount of meals eaten  - Assist patient with eating if necessary   - Allow adequate time for meals  - Recommend/ encourage appropriate diets, oral nutritional supplements, and vitamin/mineral supplements  - Order, calculate, and assess calorie counts as needed  - Recommend, monitor, and adjust tube feedings and TPN/PPN based on assessed needs  - Assess need for intravenous fluids  - Provide specific nutrition/hydration education as appropriate  - Include patient/family/caregiver in decisions related to nutrition  Outcome: Progressing     Problem: Prexisting or High Potential for Compromised Skin Integrity  Goal: Skin integrity is maintained or improved  Description: INTERVENTIONS:  - Identify patients at risk for skin breakdown  - Assess and monitor skin integrity  - Assess and monitor nutrition and hydration status  - Monitor labs   - Assess for incontinence   - Turn and reposition patient  - Assist with mobility/ambulation  - Relieve pressure over bony prominences  - Avoid friction and shearing  - Provide appropriate hygiene as needed including keeping skin clean and dry  - Evaluate need for skin moisturizer/barrier cream  - Collaborate with interdisciplinary team   - Patient/family teaching  - Consider wound care consult   Outcome: Progressing

## 2024-02-02 NOTE — DISCHARGE SUMMARY
[facility]  Discharge- Jaun Loza III 1995, 28 y.o. male MRN: [id number]  Unit/Bed#: -01 Encounter: [id number]  Primary Care Provider: No primary care provider on file.   Date and time admitted to hospital: 1/29/2024  4:29 PM    Hyperphosphatemia  Assessment & Plan  Phos 10.4 on admission  Likely secondary to SHREYAS and severe hypovolemia  Received potassium phosphate IV as now pt has hypophosphatemia  Resolved       SHREYAS (acute kidney injury) ([facility])  Assessment & Plan  Baseline Cr 0.9 in care everywhere  Cr on admission 1.94  UA bland  Likely prerenal 2/2 severe DKA, intravascular dehydration   Received aggressive IVF resuscitation   Creatinine is 0.63  Resolved    Lactic acidosis  Assessment & Plan  Lactic 7.8 on admission -- trend   In the setting of severe DKA -- do not suspect infectious etiology at this time  Continue IVF resuscitation per DKA protocol    SIRS (systemic inflammatory response syndrome) ([facility])  Assessment & Plan  SIRS: tachycardia, tachypnea, leukocytosis  SIRS likely secondary to DKA, unlikely infection  UA is negative.  Chest x-ray is unremarkable  Monitor off abx  Resolved.  Patient remains afebrile    Hyponatremia  Assessment & Plan  Pseudohyponatremia in the setting of marked hyperglycemia  Resolved    N&V (nausea and vomiting)  Assessment & Plan  Pt reports 2 days of N/V/D   Denies abdominal pain  Likely secondary to severe DKA  Antiemetics as needed  Patient is tolerating diet.  Resolved    Hx of seizure disorder  Assessment & Plan  Pt reports seizures as a child but has not had one in many years. Not on AED outpatient  Seizure precautions    * DKA, type 1 (HCC)  Assessment & Plan  Lab Results   Component Value Date    HGBA1C 9.0 (H) 01/29/2024       Recent Labs     02/01/24 2037 02/02/24  0529 02/02/24  0742 02/02/24  1037   POCGLU 120 139 208* 134         Blood Sugar Average: Last 72 hrs:  (P) [id number]  Hx of T1DM on SQ insulin  regimen  Pt reports running out of insulin needles 2 days ago. He has since had progressive N/V/D and polydipsia  DKA POA AEB: pH 6.98, CO2 7, AG 38, , BHB 5.2  Initiate DKA protocol  IVF per protocol; transition to dextrose-containing IVF when BG < 250  DKA insulin gtt; transition to non-DKA insulin gtt   Insulin drip is discontinued and patient started on Lantus insulin by endocrinology  On Humalog with meals and sliding scale  Q1h BG  Endocrinology on board  Patient is tolerating diet  Trend BMP  Replete electrolytes PRN with goal K>4 and Mag>2  Endocrinology recommended patient can be discharged on Lantus insulin 20 units daily and Humalog 5 units with meals.  Outpatient follow-up with them.  Patient was price checked for insulin and has 0 co-pay per   Patient will be discharged with all the diabetic supplies and outpatient follow-up with endocrinology      Hospital Course:     Jaun Loza III is a 28 y.o. male patient who originally presented to the hospital on   Admission Orders (From admission, onward)       Ordered        01/29/24 1711  INPATIENT ADMISSION  Once                         due to DKA.  Patient ran out of insulin needles 2 days ago and presented with DKA.  Patient was admitted to critical care service.  He was started on insulin drip and received aggressive IV fluids.  Patient was given electrolyte repletion.  Patient was seen by endocrinology.  Patient was downgraded to med surgical floor.  Patient was switched over to Lantus insulin and insulin drip was discontinued by endocrinology.  Nausea and vomiting resolved.  Patient is back to baseline and is tolerating diet.  Will be discharged with Lantus insulin and Humalog with meals as above.  Patient is hemodynamically stable for discharge with outpatient follow-up with endocrinology  On Exam-  Chest-bilateral air entry, clear to auscultation  Abdomen-soft, nontender  Heart-S1 S2 regular  Extremities-no pedal edema or calf  tenderness  Neuro-alert awake oriented x3.  No focal deficits    Please see above list of diagnoses and related plan for additional information.   Follow-up with PCP in 1 week  Follow-up with endocrinology as outpatient in 1 week    Condition at Discharge:  good      Discharge instructions/Information to patient and family:   See after visit summary for information provided to patient and family.      Provisions for Follow-Up Care:  See after visit summary for information related to follow-up care and any pertinent home health orders.      Disposition:     Home       Discharge Statement:  I spent 40 minutes discharging the patient. This time was spent on the day of discharge. I had direct contact with the patient on the day of discharge. Greater than 50% of the total time was spent examining patient, answering all patient questions, arranging and discussing plan of care with patient as well as directly providing post-discharge instructions.  Additional time then spent on discharge activities.    Discharge Medications:  See after visit summary for reconciled discharge medications provided to patient and family.      ** Please Note: This note has been constructed using a voice recognition system **

## 2024-02-02 NOTE — ASSESSMENT & PLAN NOTE
SIRS: tachycardia, tachypnea, leukocytosis  SIRS likely secondary to DKA, unlikely infection  UA is negative.  Chest x-ray is unremarkable  Monitor off abx  Resolved.  Patient remains afebrile

## 2024-02-05 NOTE — UTILIZATION REVIEW
NOTIFICATION OF ADMISSION DISCHARGE   This is a Notification of Discharge from [facility]. Please be advised that this patient has been discharge from our facility. Below you will find the admission and discharge date and time including the patient’s disposition.   UTILIZATION REVIEW CONTACT:  Fransisca Gr  Utilization   Network Utilization Review Department  Phone: [phone] carefully listen to the prompts. All voicemails are confidential.  Email: [email or web address]     ADMISSION INFORMATION  PRESENTATION DATE: 1/29/2024  4:29 PM  OBERVATION ADMISSION DATE:   INPATIENT ADMISSION DATE: 1/29/24  5:11 PM   DISCHARGE DATE: 2/2/2024  1:07 PM   DISPOSITION:Home/Self Care    Network Utilization Review Department  ATTENTION: Please call with any questions or concerns to [phone] and carefully listen to the prompts so that you are directed to the right person. All voicemails are confidential.   For Discharge needs, contact Care Management DC Support Team at [phone] opt. [phone]  Send all requests for admission clinical reviews, approved or denied determinations and any other requests to dedicated fax number below belonging to the campus where the patient is receiving treatment. List of dedicated fax numbers for the Facilities:  FACILITY NAME UR FAX NUMBER   ADMISSION DENIALS (Administrative/Medical Necessity) [phone]   DISCHARGE SUPPORT TEAM ([facility]) [phone]   PARENT CHILD HEALTH (Maternity/NICU/Pediatrics) [phone]   [facility] [phone]   [facility] [phone]   [facility] [phone]   [facility] [phone]   [facility] [phone]   [facility] [phone]   [facility] [phone]   [facility] [phone]    [facility] [phone]   [facility] [phone]   [facility] [phone]   [facility] [phone]

## 2024-02-10 LAB
ATRIAL RATE: 121 BPM
ATRIAL RATE: 122 BPM
P AXIS: 76 DEGREES
P AXIS: 77 DEGREES
PR INTERVAL: 132 MS
PR INTERVAL: 142 MS
QRS AXIS: 156 DEGREES
QRS AXIS: 175 DEGREES
QRSD INTERVAL: 92 MS
QRSD INTERVAL: 94 MS
QT INTERVAL: 328 MS
QT INTERVAL: 340 MS
QTC INTERVAL: 467 MS
QTC INTERVAL: 482 MS
T WAVE AXIS: 54 DEGREES
T WAVE AXIS: 58 DEGREES
VENTRICULAR RATE: 121 BPM
VENTRICULAR RATE: 122 BPM

## 2024-03-08 ENCOUNTER — HOSPITAL ENCOUNTER (EMERGENCY)
Facility: HOSPITAL | Age: 29
Discharge: HOME | End: 2024-03-08
Attending: EMERGENCY MEDICINE
Payer: OTHER MISCELLANEOUS

## 2024-03-08 VITALS
BODY MASS INDEX: 21.71 KG/M2 | RESPIRATION RATE: 20 BRPM | HEART RATE: 82 BPM | WEIGHT: 134.48 LBS | SYSTOLIC BLOOD PRESSURE: 120 MMHG | OXYGEN SATURATION: 98 % | TEMPERATURE: 98.1 F | DIASTOLIC BLOOD PRESSURE: 90 MMHG

## 2024-03-08 DIAGNOSIS — E10.9 TYPE 1 DIABETES MELLITUS (HCC): ICD-10-CM

## 2024-03-08 DIAGNOSIS — S09.90XA MINOR CLOSED HEAD INJURY: Primary | ICD-10-CM

## 2024-03-08 LAB
ATRIAL RATE: 83 BPM
P AXIS: 55 DEGREES
PR INTERVAL: 158 MS
QRS AXIS: 135 DEGREES
QRSD INTERVAL: 96 MS
QT INTERVAL: 394 MS
QTC INTERVAL: 462 MS
T WAVE AXIS: 17 DEGREES
VENTRICULAR RATE: 83 BPM

## 2024-03-08 PROCEDURE — 99284 EMERGENCY DEPT VISIT MOD MDM: CPT | Performed by: EMERGENCY MEDICINE

## 2024-03-08 PROCEDURE — 99284 EMERGENCY DEPT VISIT MOD MDM: CPT

## 2024-03-08 PROCEDURE — 93005 ELECTROCARDIOGRAM TRACING: CPT

## 2024-03-08 PROCEDURE — 93010 ELECTROCARDIOGRAM REPORT: CPT | Performed by: INTERNAL MEDICINE

## 2024-03-08 NOTE — ED PROVIDER NOTES
"History  Chief Complaint   Patient presents with   • Syncope     To ED with EMS for syncopal event. Patient was found on the floor of trailer unpacking boxes. Co workers state that patient was on the floor face down, \"passed out\" for less than a minute. Boxes were on the floor but unclear as to injury from boxes or the floor.      28-year-old male presents via EMS for the evaluation of a potential head injury or syncope that occurred at work.  Patient states that he was unloading boxes on a truck and he thinks that one of them fell and struck him in the head.  The patient denies any headache at this time.  The patient denies nausea change in vision.  Denies any numbness or tingling.  The patient states that he is in his usual state of health.  The patient denies any neck pain.      Syncope      Prior to Admission Medications   Prescriptions Last Dose Informant Patient Reported? Taking?   Alcohol Swabs 70 % PADS   No No   Sig: May substitute brand based on insurance coverage. Check glucose BID.   Blood Glucose Monitoring Suppl (OneTouch Verio Reflect) w/Device KIT   No No   Sig: May substitute brand based on insurance coverage. Check glucose BID.   OneTouch Delica Lancets 33G MISC   No No   Sig: May substitute brand based on insurance coverage. Check glucose BID.   glucose blood (OneTouch Verio) test strip   No No   Sig: May substitute brand based on insurance coverage. Check glucose BID.   insulin glargine (LANTUS) 100 units/mL subcutaneous injection   No No   Sig: Inject 20 Units under the skin daily with breakfast   insulin lispro (HumaLOG) 100 units/mL injection   No No   Sig: Inject 5 Units under the skin 3 (three) times a day with meals      Facility-Administered Medications: None       Past Medical History:   Diagnosis Date   • Type 1 diabetes mellitus (HCC)        History reviewed. No pertinent surgical history.    History reviewed. No pertinent family history.  I have reviewed and agree with the history as " documented.    E-Cigarette/Vaping   • E-Cigarette Use Never User      E-Cigarette/Vaping Substances     Social History     Tobacco Use   • Smoking status: Never   • Smokeless tobacco: Never   Vaping Use   • Vaping status: Never Used   Substance Use Topics   • Alcohol use: Never   • Drug use: Yes     Types: Marijuana       Review of Systems   Cardiovascular:  Positive for syncope.       Physical Exam  Physical Exam  Vitals and nursing note reviewed.   Constitutional:       General: He is not in acute distress.     Appearance: He is well-developed.   HENT:      Head: Normocephalic and atraumatic. No raccoon eyes or Connolly's sign.      Right Ear: External ear normal. No hemotympanum.      Left Ear: External ear normal. No hemotympanum.      Nose: No nasal deformity.   Eyes:      Conjunctiva/sclera: Conjunctivae normal.      Pupils: Pupils are equal, round, and reactive to light.   Neck:      Trachea: No tracheal deviation.   Cardiovascular:      Rate and Rhythm: Normal rate and regular rhythm.      Heart sounds: Normal heart sounds. No murmur heard.  Pulmonary:      Effort: Pulmonary effort is normal. No respiratory distress.      Breath sounds: Normal breath sounds.   Chest:      Chest wall: No tenderness.   Abdominal:      General: There is no distension.      Palpations: Abdomen is soft.      Tenderness: There is no abdominal tenderness. There is no guarding or rebound.   Musculoskeletal:         General: No tenderness. Normal range of motion.      Cervical back: Normal range of motion.   Skin:     General: Skin is warm and dry.   Neurological:      Mental Status: He is alert and oriented to person, place, and time.      Deep Tendon Reflexes: Reflexes are normal and symmetric.       Vital Signs  ED Triage Vitals [03/08/24 1535]   Temperature Pulse Respirations Blood Pressure SpO2   98.1 °F (36.7 °C) 82 20 120/90 98 %      Temp Source Heart Rate Source Patient Position - Orthostatic VS BP Location FiO2 (%)   Oral  Monitor Lying Left arm --      Pain Score       2           Vitals:    03/08/24 1535   BP: 120/90   Pulse: 82   Patient Position - Orthostatic VS: Lying         Visual Acuity      ED Medications  Medications - No data to display    Diagnostic Studies  Results Reviewed       None                   No orders to display              Procedures  ECG 12 Lead Documentation Only    Date/Time: 3/8/2024 3:49 PM    Performed by: Aaron De Jesus DO  Authorized by: Aaron De Jesus DO    Indications / Diagnosis:  Syncope  ECG reviewed by me, the ED Provider: yes    Patient location:  ED  Previous ECG:     Previous ECG:  Compared to current    Comparison ECG info:  1/31/24    Similarity:  No change  Interpretation:     Interpretation: abnormal    Rate:     ECG rate:  83    ECG rate assessment: normal    Rhythm:     Rhythm: sinus rhythm    Ectopy:     Ectopy: none    QRS:     QRS axis:  Right    QRS intervals:  Normal  Conduction:     Conduction: abnormal      Abnormal conduction: incomplete RBBB    ST segments:     ST segments:  Normal  T waves:     T waves: normal             ED Course                               SBIRT 22yo+      Flowsheet Row Most Recent Value   Initial Alcohol Screen: US AUDIT-C     1. How often do you have a drink containing alcohol? 0 Filed at: 03/08/2024 1537   2. How many drinks containing alcohol do you have on a typical day you are drinking?  0 Filed at: 03/08/2024 1537   3a. Male UNDER 65: How often do you have five or more drinks on one occasion? 0 Filed at: 03/08/2024 1537   3b. FEMALE Any Age, or MALE 65+: How often do you have 4 or more drinks on one occassion? 0 Filed at: 03/08/2024 1537   Audit-C Score 0 Filed at: 03/08/2024 1537   CLEMENCIA: How many times in the past year have you...    Used an illegal drug or used a prescription medication for non-medical reasons? Never Filed at: 03/08/2024 1537                      Medical Decision Making  Differential diagnosis: Minor closed head injury  with loss of consciousness, syncope, arrhythmia.  Patient's blood sugar was normal therefore a hypoglycemic event secondary to his diabetes is unlikely.    Patient with normal exam without traumatic findings.  EKG does not demonstrate prolonged QT or any other concerning EKG findings for ion channelopathies.    The patient (and any family present) verbalized understanding of the discharge instructions and warnings that would necessitate return to the Emergency Department.    All questions were answered prior to discharge.             Disposition  Final diagnoses:   Minor closed head injury   Type 1 diabetes mellitus (HCC)     Time reflects when diagnosis was documented in both MDM as applicable and the Disposition within this note       Time User Action Codes Description Comment    3/8/2024  3:44 PM Aaron De Jesus Add [S09.90XA] Minor closed head injury     3/8/2024  3:44 PM Aaron De Jesus Add [E10.9] Type 1 diabetes mellitus (HCC)           ED Disposition       ED Disposition   Discharge    Condition   Stable    Date/Time   Fri Mar 8, 2024 1544    Comment   Jaun Loza III discharge to home/self care.                   Follow-up Information    None         Current Discharge Medication List        CONTINUE these medications which have NOT CHANGED    Details   Alcohol Swabs 70 % PADS May substitute brand based on insurance coverage. Check glucose BID.  Qty: 100 each, Refills: 0    Associated Diagnoses: DKA, type 1 (HCC)      Blood Glucose Monitoring Suppl (OneTouch Verio Reflect) w/Device KIT May substitute brand based on insurance coverage. Check glucose BID.  Qty: 1 kit, Refills: 0    Associated Diagnoses: DKA, type 1 (HCC)      glucose blood (OneTouch Verio) test strip May substitute brand based on insurance coverage. Check glucose BID.  Qty: 100 each, Refills: 0    Associated Diagnoses: DKA, type 1 (HCC)      insulin glargine (LANTUS) 100 units/mL subcutaneous injection Inject 20 Units under the skin  daily with breakfast  Qty: 6 mL, Refills: 0    Associated Diagnoses: DKA, type 1 (HCC)      insulin lispro (HumaLOG) 100 units/mL injection Inject 5 Units under the skin 3 (three) times a day with meals  Qty: 4.5 mL, Refills: 0    Associated Diagnoses: DKA, type 1 (HCC)      OneTouch Delica Lancets 33G MISC May substitute brand based on insurance coverage. Check glucose BID.  Qty: 100 each, Refills: 0    Associated Diagnoses: DKA, type 1 (HCC)             No discharge procedures on file.    PDMP Review         Value Time User    PDMP Reviewed  Yes 2/2/2024 12:12 PM Armando Salinas MD            ED Provider  Electronically Signed by

## 2024-08-06 ENCOUNTER — APPOINTMENT (OUTPATIENT)
Dept: URGENT CARE | Facility: CLINIC | Age: 29
End: 2024-08-06

## 2024-08-06 ENCOUNTER — APPOINTMENT (OUTPATIENT)
Dept: PHYSICAL THERAPY | Facility: CLINIC | Age: 29
End: 2024-08-06

## 2024-08-06 PROCEDURE — 97530 THERAPEUTIC ACTIVITIES: CPT

## 2025-01-21 ENCOUNTER — TELEPHONE (OUTPATIENT)
Age: 30
End: 2025-01-21